# Patient Record
Sex: MALE | Race: WHITE | HISPANIC OR LATINO | URBAN - METROPOLITAN AREA
[De-identification: names, ages, dates, MRNs, and addresses within clinical notes are randomized per-mention and may not be internally consistent; named-entity substitution may affect disease eponyms.]

---

## 2023-09-18 ENCOUNTER — GYNECOLOGY VISIT (OUTPATIENT)
Dept: OBGYN | Facility: CLINIC | Age: 28
End: 2023-09-18
Payer: COMMERCIAL

## 2023-09-18 VITALS
SYSTOLIC BLOOD PRESSURE: 149 MMHG | DIASTOLIC BLOOD PRESSURE: 93 MMHG | HEIGHT: 66 IN | BODY MASS INDEX: 37.12 KG/M2 | WEIGHT: 231 LBS

## 2023-09-18 DIAGNOSIS — Z01.419 WOMEN'S ANNUAL ROUTINE GYNECOLOGICAL EXAMINATION: Primary | ICD-10-CM

## 2023-09-18 DIAGNOSIS — Z31.69 ENCOUNTER FOR PRECONCEPTION CONSULTATION: ICD-10-CM

## 2023-09-18 PROCEDURE — 99385 PREV VISIT NEW AGE 18-39: CPT | Performed by: OBSTETRICS & GYNECOLOGY

## 2023-09-18 PROCEDURE — 3077F SYST BP >= 140 MM HG: CPT | Performed by: OBSTETRICS & GYNECOLOGY

## 2023-09-18 PROCEDURE — 3080F DIAST BP >= 90 MM HG: CPT | Performed by: OBSTETRICS & GYNECOLOGY

## 2023-09-18 RX ORDER — ONDANSETRON 4 MG/1
TABLET, FILM COATED ORAL
COMMUNITY
Start: 2023-07-10

## 2023-09-18 NOTE — PROGRESS NOTES
ANNUAL GYNECOLOGY VISIT    Chief Complaint  Other (Family planning )      Subjective  Bora Rodrigez is a 28 y.o. adult who presents today for Annual Exam.  Patient is a transgender male and is sexually active () to a transgender female. They both recently stopped their hormone therapies to pursue pregnancy.  He reports since stopping hormones his menses have returned and our regular and ovulation predictor kits show ovulation around day 15.  His partner has trouble on occasion ejaculating with penetration so she has has been trying to ejaculate into a a clean menstrual cup and then inject it vaginally using a bulb syringe.  At this time, they have been trying to conceive for about 5 months and are not in a rush to pursue infertility concerns just yet.      Preventive Care     There is no immunization history on file for this patient.  Last Mammogram: n/a    Gynecology History and ROS  Current Sexual Activity: Yes  Current Contraception:  none    Menstrual History  No LMP recorded.  Periods are regular  q 28-31 days    Clots or heavy flow: No  Dysmenorrhea: No  Intermenstrual bleeding/spotting: No  Significant pain with periods:No  Bothersome PMS symptoms: No  Significant Pelvic Pain: No      Pap History  Last pap smear:  at PCP. Records requested  History of moderate or severe dysplasia: No    Cancer Risk Assessement:  Family history of:   - Breast cancer: no   - Ovarian cancer: no   - Uterine cancer: no   - Colon cancer: no    Obstetric History  OB History    Para Term  AB Living   0 0 0 0 0 0   SAB IAB Ectopic Molar Multiple Live Births   0 0 0 0 0 0       Past Medical History  Past Medical History:   Diagnosis Date    Allergy     Depression     Head ache     Migraine        Past Surgical History  Past Surgical History:   Procedure Laterality Date    MASTECTOMY BILATERAL SUBQ         Social History  Social History     Socioeconomic History    Marital status: Single     Spouse name: Not  on file    Number of children: Not on file    Years of education: Not on file    Highest education level: Not on file   Occupational History    Not on file   Tobacco Use    Smoking status: Never    Smokeless tobacco: Never   Vaping Use    Vaping Use: Never used   Substance and Sexual Activity    Alcohol use: Yes    Drug use: Never    Sexual activity: Yes     Partners: Male     Comment: pt is transgender male, sexually active with transgender female   Other Topics Concern    Not on file   Social History Narrative    Not on file     Social Determinants of Health     Financial Resource Strain: Not on file   Food Insecurity: Not on file   Transportation Needs: Not on file   Physical Activity: Not on file   Stress: Not on file   Social Connections: Not on file   Intimate Partner Violence: Not on file   Housing Stability: Not on file       Family History  Family History   Problem Relation Age of Onset    Thyroid Mother     Fibromyalgia Mother     Hypertension Father     Lung Cancer Maternal Grandmother     Hypertension Other        Home Medications  Current Outpatient Medications on File Prior to Visit   Medication Sig Dispense Refill    ondansetron (ZOFRAN) 4 MG Tab tablet 1 TABLET ORALLY TWICE DAILY AS NEEDED 20 DAYS       No current facility-administered medications on file prior to visit.       Allergies/Reactions  Allergies   Allergen Reactions    Cottonseed Oil Hives       ROS  Positive ROS: none  Gen: no fevers or chills, no significant weight loss or gain, excessive fatigue  Respiratory:  no cough or dyspnea  Cardiac:  no chest pain, no palpitations, no syncope  Breast: no breast discharge, pain, lump or skin changes  GI:  no heartburn, no abdominal pain, no nausea or vomiting  Urinary: no dysuria, urgency, frequency, incontinence   Psych: no depression or anxiety  Neuro: no migraines with aura, fainting spells, numbness or tingling  Extremities: no joint pain, persistently swollen ankles, recurrent leg  "cramps      Physical Examination:  Vital Signs:   Vitals:    09/18/23 1441   BP: (!) 149/93   BP Location: Right arm   Patient Position: Sitting   BP Cuff Size: Adult   Weight: 231 lb   Height: 5' 6\"     Body mass index is 37.28 kg/m².    Constitutional: The patient is well developed and well nourished.  Psychiatric: Patient is oriented to time place and person.   Skin: No rash observed.  Neck: Appears symmetric. Thyroid normal size  Respiratory: normal effort  Breast: double mastectomy  Abdomen: Soft, non-tender.  Pelvic Exam:     Vulva: external female genitalia are normal in appearance. No lesions    Urethra - no lesions, no erythema    Vagina: moist, pink, normal ruggae    Cervix: pink, smooth, no lesions, no CMT    Uterus - mildly enlarged, normal, shape, contour, mobile, anteverted    Ovaries: non-tender, no appreciable masses  Pap Smear performed: no  Extremeties: Legs are symmetric and without tenderness. There is no edema present.      Assessment & Plan  Bora Rodrigez is a 28 y.o. adult who presents today for Annual Gyn Exam.     1. Women's annual routine gynecological examination   - pap up to date   - regular menses   - history of double mastectomy    2. Encounter for preconception consultation   - take PNV   - reviewed timed intercourse and ovulation timing   - consider semen analysis or urology consult for partner if deemed necessary   - consider US to eval enlarged uterus for fibroids but these generally do not preclude or impair pregnancy      Return: Annually or PRN    Kassie Jensen D.O.        "

## 2023-09-18 NOTE — PROGRESS NOTES
Patient here for family planning   pharmacy verified.  Patient phone #:[phone] (home)     LMP- 9/10/23  PAP- a year ago [facility] medicine - records have been requested

## 2024-04-10 ENCOUNTER — PATIENT MESSAGE (OUTPATIENT)
Dept: OBGYN | Facility: CLINIC | Age: 29
End: 2024-04-10
Payer: COMMERCIAL

## 2024-04-10 DIAGNOSIS — O26.851 SPOTTING AFFECTING PREGNANCY IN FIRST TRIMESTER: ICD-10-CM

## 2024-04-15 ENCOUNTER — HOSPITAL ENCOUNTER (OUTPATIENT)
Dept: LAB | Facility: MEDICAL CENTER | Age: 29
End: 2024-04-15
Attending: OBSTETRICS & GYNECOLOGY
Payer: COMMERCIAL

## 2024-04-15 DIAGNOSIS — O26.851 SPOTTING AFFECTING PREGNANCY IN FIRST TRIMESTER: ICD-10-CM

## 2024-04-15 LAB — B-HCG SERPL-ACNC: ABNORMAL MIU/ML (ref 0–5)

## 2024-04-15 PROCEDURE — 84702 CHORIONIC GONADOTROPIN TEST: CPT

## 2024-04-15 PROCEDURE — 36415 COLL VENOUS BLD VENIPUNCTURE: CPT

## 2024-04-16 ENCOUNTER — HOSPITAL ENCOUNTER (OUTPATIENT)
Dept: RADIOLOGY | Facility: MEDICAL CENTER | Age: 29
End: 2024-04-16
Attending: OBSTETRICS & GYNECOLOGY
Payer: COMMERCIAL

## 2024-04-16 DIAGNOSIS — O26.851 SPOTTING AFFECTING PREGNANCY IN FIRST TRIMESTER: ICD-10-CM

## 2024-04-16 PROCEDURE — 76817 TRANSVAGINAL US OBSTETRIC: CPT

## 2024-05-09 ENCOUNTER — INITIAL PRENATAL (OUTPATIENT)
Dept: OBGYN | Facility: CLINIC | Age: 29
End: 2024-05-09
Payer: COMMERCIAL

## 2024-05-09 ENCOUNTER — HOSPITAL ENCOUNTER (OUTPATIENT)
Facility: MEDICAL CENTER | Age: 29
End: 2024-05-09
Attending: OBSTETRICS & GYNECOLOGY
Payer: COMMERCIAL

## 2024-05-09 VITALS — SYSTOLIC BLOOD PRESSURE: 134 MMHG | WEIGHT: 241 LBS | DIASTOLIC BLOOD PRESSURE: 100 MMHG | BODY MASS INDEX: 38.9 KG/M2

## 2024-05-09 DIAGNOSIS — N83.202 LEFT OVARIAN CYST: ICD-10-CM

## 2024-05-09 DIAGNOSIS — Z34.81 PRENATAL CARE, SUBSEQUENT PREGNANCY IN FIRST TRIMESTER: ICD-10-CM

## 2024-05-09 DIAGNOSIS — Z34.81 PRENATAL CARE, SUBSEQUENT PREGNANCY IN FIRST TRIMESTER: Primary | ICD-10-CM

## 2024-05-09 PROBLEM — Z34.80 PRENATAL CARE, SUBSEQUENT PREGNANCY: Status: ACTIVE | Noted: 2024-05-09

## 2024-05-09 PROCEDURE — 0500F INITIAL PRENATAL CARE VISIT: CPT | Performed by: OBSTETRICS & GYNECOLOGY

## 2024-05-09 RX ORDER — CHLORAL HYDRATE 500 MG
3000 CAPSULE ORAL DAILY
COMMUNITY

## 2024-05-09 ASSESSMENT — EDINBURGH POSTNATAL DEPRESSION SCALE (EPDS)
TOTAL SCORE: 10
I HAVE BEEN SO UNHAPPY THAT I HAVE BEEN CRYING: NO, NEVER
I HAVE BLAMED MYSELF UNNECESSARILY WHEN THINGS WENT WRONG: NO, NEVER
THE THOUGHT OF HARMING MYSELF HAS OCCURRED TO ME: NEVER
I HAVE BEEN ABLE TO LAUGH AND SEE THE FUNNY SIDE OF THINGS: NOT QUITE SO MUCH NOW
THINGS HAVE BEEN GETTING ON TOP OF ME: YES, SOMETIMES I HAVEN'T BEEN COPING AS WELL AS USUAL
I HAVE LOOKED FORWARD WITH ENJOYMENT TO THINGS: RATHER LESS THAN I USED TO
I HAVE BEEN ANXIOUS OR WORRIED FOR NO GOOD REASON: YES, SOMETIMES
I HAVE FELT SCARED OR PANICKY FOR NO GOOD REASON: YES, SOMETIMES
I HAVE BEEN SO UNHAPPY THAT I HAVE HAD DIFFICULTY SLEEPING: NOT VERY OFTEN
I HAVE FELT SAD OR MISERABLE: NOT VERY OFTEN

## 2024-05-09 NOTE — PROGRESS NOTES
Establish Pregnancy Visit    CC: First OB Visit    HPI: Patient is a 28 y.o.  at 9w2d who presents for his first OB visit.  Patient is a transgender male  to a transgender female.  This is their 2nd pregnancy.  He experienced an early miscarriage in November and was treated with D&C at [place].   He had an early US at 5w6d which confirmed NILS c/w LMP.  US demonstrated large left adnexal cyst which was likely what was felt on prior new patient exam back in September.  He denies vaginal bleeding, denies nausea, denies vomiting.   He denies headaches, or urinary symptoms.      DATING:     Procedure:  Transvaginal US performed by me and per my read:    Indication: confirm fetal viability    Findings:   G[place] intrauterine pregnancy @ 9w0d by CRL.   Positive yolk sac.   Positive fetal cardiac activity    Right ovary wnl.   Left Ovary large complex left ovarian cyst, possible endometrioma.   No free fluid in the cul-de-sac.    Impression:   Viable IUP @ 9w0d.  NILS by US of 24.  NILS by LMP: 12/10/24  Final NILS: 12/10/24      GYN HX:   Last Pap:   Hx Moderate or Severe Dysplasia : no  Hx STD : no    OBSTETRIC HISTORY:  OB History    Para Term  AB Living   2 0 0 0 1 0   SAB IAB Ectopic Molar Multiple Live Births   1 0 0 0 0 0      # Outcome Date GA Lbr Willi/2nd Weight Sex Delivery Anes PTL Lv   2 Current            1 SAB 2023              Birth Comments: D&C at [place]       MEDICAL HISTORY:  Past Medical History:   Diagnosis Date    Allergy     Depression     Head ache     Migraine        MEDICATIONS:  Current Outpatient Medications on File Prior to Visit   Medication Sig Dispense Refill    Prenatal MV-Min-Fe Fum-FA-DHA (PRENATAL 1 PO) Take  by mouth.      Omega-3 Fatty Acids (FISH OIL) 1000 MG Cap capsule Take 1,000 mg by mouth 3 times a day with meals.      ondansetron (ZOFRAN) 4 MG Tab tablet 1 TABLET ORALLY TWICE DAILY AS NEEDED 20 DAYS       No current facility-administered  medications on file prior to visit.       FAMILY HISTORY:  Family History   Problem Relation Age of Onset    Thyroid Mother     Fibromyalgia Mother     Hypertension Father     Lung Cancer Maternal Grandmother     Hypertension Other        SURGICAL HISTORY:  Past Surgical History:   Procedure Laterality Date    DILATION AND CURETTAGE  11/10/2023    MASTECTOMY BILATERAL SUBQ         ALLERGIES / REACTIONS:  Allergies   Allergen Reactions    Cottonseed Oil Hives                SOCIAL HISTORY:   reports that he has never smoked. He has never used smokeless tobacco. He reports current alcohol use. He reports that he does not use drugs.    ROS:   Gen: no fevers or chills, no significant weight loss or gain, excessive fatigue  Respiratory:  no cough or dyspnea  Cardiac:  no chest pain, no palpitations, no syncope  Breast: no breast discharge, pain, lump or skin changes  GI:  no heartburn, no abdominal pain, no nausea or vomiting  Urinary: no dysuria, urgency, frequency, incontinence   Psych: no depression or anxiety  Neuro: no migraines with aura, fainting spells, numbness or tingling  Extremities: no joint pain, persistently swollen ankles, recurrent leg cramps         PHYSICAL EXAMINATION:  Vital Signs:   Vitals:    05/09/24 1011   BP: (!) 134/100   Weight: 241 lb     Body mass index is 38.9 kg/m².  Constitutional: The patient is well developed and well nourished.  Psychiatric: Patient is oriented to time place and person.   Skin: No rash observed.  Neck: Neck appears symmetric. There are no masses or adenopathy present.  Respiratory: normal effort  Abdomen: Soft, non-tender.  Pelvic:    Vulva: normal.    Urethra: normal.   Vagina: normal.    Cervix: normal.    Uterus: consistent with dates    Adnexa: normal.   Perineum: normal.   GC / Chlamydia cultures obtained.   Pap Smear Obtained: yes  Extremeties: Legs are symmetric and without tenderness. There is no edema present.    ACOG SCREENING  Infection Prevention  1. High  Risk For HIV: No 6. Rash Or Illness Since LMP: No     2. High Risk For Hepatitis B or C: No 7. History Of STD, GC, Chlamydia, HPV Syphilis: No     3. Live With Someone With TB Or Exposed To TB: No 8. Have a cat in the home?: Yes     4. Patient Or Partner Has A History Of Herpes: No 8a. Responsible for changing the litter?: No     5. History of Chicken Pox: Yes             Genetic Screening/Teratology Counseling- Includes patient, baby's father, or anyone in either family with:  Patient's age 35 years or older as of estimated date of delivery: No     Thalassemia (Italian, Greek, Mediterranean, or  background): MCV less than 80: No     Neural tube defect (Meningomyelocele, Spina bifida, or Anencephaly): No     Congenital heart defect: No     Down syndrome: No     Rigo-Sachs (Ashkenazi Pentecostalism, Cajun, English [place]): No     Canavan disease (Ashkenazi Pentecostalism): No     Familial dysautonomia (Ashkenazi Pentecostalism): No     Sickle cell disease or trait (): No     Hemophilia or other blood disorders: No     Muscular dystrophy: No    Cystic fibrosis: No     Chip's chorea: No     Mental retardation/autism: Yes     If yes, was the person tested for Fragile X?: No     Other inherited genetic or chromosomal disorder: No     Maternal metabolic disorder (eg. Type 1 diabetes, PKU): No     Patient or baby's father had child with birth defects not listed above: No     Recurrent pregnancy loss, or a stillbirth: No     Medications (including supplements, vitamins, herbs, or OTC drugs)/illicit/recreational drugs/alcohol since last menstrual period: No                 ASSESSMENT AND PLAN:  28 y.o.  at 9w2d     Pregnancy Problems (from 23 to present)       Problem Noted Resolved    Prenatal care, subsequent pregnancy 2024 by Kassie Jensen D.O. No    Priority:  High      Overview Signed 2024 10:44 AM by Kassie Jensen D.O.     Declines NIPT/carrier screening  PNL 2024                  1.  Prenatal care, subsequent pregnancy in first trimester  - PNL ordered and std screening completed today off pap  - Dating reviewed: Dated by LMP  - Discussed options for genetic/aneuploidy testing and information given for pt to consider.  Advised to call insurance for cost of testing.           - he currently declines aneuploidy testing.           - he currently declines CF/SMA testing.  - Discussed recommendation for flu, Covid vaccine during pregnancy  - Discussed office policies, prenatal care timeline, weight gain, diet and activity.  - Taking PNV.  - Increase water intake and encouraged healthy nutrition. Encouraged moderate exercise may continue into final trimester.   - PREG CNTR PRENATAL PN; Future  - URINE DRUG SCREEN W/CONF (AR); Future  - THINPREP RFLX HPV ASCUS W/CTNG; Future    2. Left ovarian cyst   - discussed recommendation for removal via cystectomy or oophorectomy in the 2nd trimester given large size and risk of torsion and risk of need for emergency surgery   - discussed attempt at laparoscopic surgery but chance of need to convert to open   - will schedule between 14-16 weeks        Return in 4 weeks for next prenatal visit    Kassie Jensen D.O.

## 2024-05-12 ENCOUNTER — PATIENT MESSAGE (OUTPATIENT)
Dept: OBGYN | Facility: CLINIC | Age: 29
End: 2024-05-12
Payer: COMMERCIAL

## 2024-05-13 ENCOUNTER — HOSPITAL ENCOUNTER (OUTPATIENT)
Dept: LAB | Facility: MEDICAL CENTER | Age: 29
End: 2024-05-13
Attending: OBSTETRICS & GYNECOLOGY
Payer: COMMERCIAL

## 2024-05-13 DIAGNOSIS — Z34.81 PRENATAL CARE, SUBSEQUENT PREGNANCY IN FIRST TRIMESTER: ICD-10-CM

## 2024-05-13 LAB
ABO GROUP BLD: NORMAL
BLD GP AB SCN SERPL QL: NORMAL
C TRACH RRNA CVX QL NAA+PROBE: NEGATIVE
COMMENT NL11729A: NORMAL
CYTOLOGIST CVX/VAG CYTO: NORMAL
CYTOLOGY CVX/VAG DOC CYTO: NORMAL
CYTOLOGY CVX/VAG DOC THIN PREP: NORMAL
ERYTHROCYTE [DISTWIDTH] IN BLOOD BY AUTOMATED COUNT: 38 FL (ref 35.9–50)
HBV SURFACE AG SER QL: NORMAL
HCT VFR BLD AUTO: 38 % (ref 37–47)
HCV AB SER QL: NORMAL
HGB BLD-MCNC: 13.3 G/DL (ref 12–16)
HIV 1+2 AB+HIV1 P24 AG SERPL QL IA: NORMAL
MCH RBC QN AUTO: 29 PG (ref 27–33)
MCHC RBC AUTO-ENTMCNC: 35 G/DL (ref 32.2–35.5)
MCV RBC AUTO: 82.8 FL (ref 81.4–97.8)
N GONORRHOEA RRNA CVX QL NAA+PROBE: NEGATIVE
NOTE NL11727A: NORMAL
OTHER STN SPEC: NORMAL
PLATELET # BLD AUTO: 213 K/UL (ref 164–446)
PMV BLD AUTO: 10.9 FL (ref 9–12.9)
RBC # BLD AUTO: 4.59 M/UL (ref 4.2–5.4)
RH BLD: NORMAL
RUBV AB SER QL: 296 IU/ML
STAT OF ADQ CVX/VAG CYTO-IMP: NORMAL
T PALLIDUM AB SER QL IA: NORMAL
WBC # BLD AUTO: 6.1 K/UL (ref 4.8–10.8)

## 2024-05-15 ENCOUNTER — APPOINTMENT (OUTPATIENT)
Dept: ADMISSIONS | Facility: MEDICAL CENTER | Age: 29
End: 2024-05-15
Attending: OBSTETRICS & GYNECOLOGY
Payer: COMMERCIAL

## 2024-05-15 LAB
AMPHET CTO UR CFM-MCNC: NEGATIVE NG/ML
BACTERIA UR CULT: NORMAL
BARBITURATES CTO UR CFM-MCNC: NEGATIVE NG/ML
BENZODIAZ CTO UR CFM-MCNC: NEGATIVE NG/ML
CANNABINOIDS CTO UR CFM-MCNC: NEGATIVE NG/ML
COCAINE CTO UR CFM-MCNC: NEGATIVE NG/ML
CREAT UR-MCNC: 20.4 MG/DL (ref 20–400)
DRUG COMMENT 753798: NORMAL
METHADONE CTO UR CFM-MCNC: NEGATIVE NG/ML
OPIATES CTO UR CFM-MCNC: NEGATIVE NG/ML
PCP CTO UR CFM-MCNC: NEGATIVE NG/ML
PROPOXYPH CTO UR CFM-MCNC: NEGATIVE NG/ML
SIGNIFICANT IND 70042: NORMAL
SITE SITE: NORMAL
SOURCE SOURCE: NORMAL

## 2024-05-20 ENCOUNTER — GYNECOLOGY VISIT (OUTPATIENT)
Dept: OBGYN | Facility: CLINIC | Age: 29
End: 2024-05-20
Payer: COMMERCIAL

## 2024-05-20 VITALS — DIASTOLIC BLOOD PRESSURE: 77 MMHG | SYSTOLIC BLOOD PRESSURE: 109 MMHG | BODY MASS INDEX: 39.08 KG/M2 | WEIGHT: 242.1 LBS

## 2024-05-20 DIAGNOSIS — Z33.1 PREGNANCY, INCIDENTAL: ICD-10-CM

## 2024-05-20 DIAGNOSIS — Z34.81 ENCOUNTER FOR SUPERVISION OF OTHER NORMAL PREGNANCY, FIRST TRIMESTER: ICD-10-CM

## 2024-05-20 DIAGNOSIS — Z01.818 PREOP EXAMINATION: ICD-10-CM

## 2024-05-20 DIAGNOSIS — N83.202 LEFT OVARIAN CYST: ICD-10-CM

## 2024-05-20 PROCEDURE — 99213 OFFICE O/P EST LOW 20 MIN: CPT | Performed by: OBSTETRICS & GYNECOLOGY

## 2024-05-20 NOTE — PROGRESS NOTES
Pt is here for a pre OP visit    OP on 6/5/24 for laparoscopic ovarian cystectomy.     Phone/pharmacy verified.

## 2024-05-20 NOTE — PROGRESS NOTES
GYNECOLOGY PRE-OPERATIVE HISTORY AND PHYSICAL    CC: Preop      HPI: Patient is a 29 y.o.  who presents for his pre-operative history and physical. He is scheduled to undergo laparoscopic ovarian cystectomy, possible salpingo-oophorectomy, possible open for large complex ovarian cyst measuring upwards of 13-14cm with possible small solid component.   Patient is currently 10w6d and will be 13w1d on day of surgery      OBSTETRIC HISTORY:  OB History    Para Term  AB Living   2 0 0 0 1 0   SAB IAB Ectopic Molar Multiple Live Births   1 0 0 0 0 0      # Outcome Date GA Lbr Willi/2nd Weight Sex Delivery Anes PTL Lv   2 Current            1 SAB 2023              Birth Comments: D&C at [facility]       MEDICAL HISTORY:  Past Medical History:   Diagnosis Date    Allergy     Depression     Head ache     Migraine        MEDICATIONS:  Current Outpatient Medications on File Prior to Visit   Medication Sig Dispense Refill    Prenatal MV-Min-Fe Fum-FA-DHA (PRENATAL 1 PO) Take  by mouth.      Omega-3 Fatty Acids (FISH OIL) 1000 MG Cap capsule Take 1,000 mg by mouth 3 times a day with meals.       No current facility-administered medications on file prior to visit.       ALLERGIES / REACTIONS:  Allergies   Allergen Reactions    Cottonseed Oil Hives       FAMILY HISTORY:  Family History   Problem Relation Age of Onset    Thyroid Mother     Fibromyalgia Mother     Hypertension Father     Lung Cancer Maternal Grandmother     Hypertension Other        SURGICAL HISTORY:  Past Surgical History:   Procedure Laterality Date    DILATION AND CURETTAGE  11/10/2023    MASTECTOMY BILATERAL SUBQ         SOCIAL HISTORY:   reports that he has never smoked. He has never used smokeless tobacco. He reports current alcohol use. He reports that he does not use drugs.  Social History     Socioeconomic History    Marital status: Single     Spouse name: Not on file    Number of children: Not on file    Years of education: Not on file     Highest education level: Not on file   Occupational History    Not on file   Tobacco Use    Smoking status: Never    Smokeless tobacco: Never   Vaping Use    Vaping status: Never Used   Substance and Sexual Activity    Alcohol use: Yes    Drug use: Never    Sexual activity: Yes     Partners: Male     Comment: pt is transgender male, sexually active with transgender female   Other Topics Concern    Not on file   Social History Narrative    Not on file     Social Determinants of Health     Financial Resource Strain: Not on file   Food Insecurity: Not on file   Transportation Needs: Not on file   Physical Activity: Not on file   Stress: Not on file   Social Connections: Not on file   Intimate Partner Violence: Not on file   Housing Stability: Not on file         ROS:  General: Fever: no  HEENT: Sore Throat: no  CV: Chest Pain: no  Repiratory: Shortness of Breath: no  GI: Abdominal pain: no  : Dysuria: no    PHYSICAL EXAMINATION:  Vital Signs:   Vitals:    05/20/24 1058   BP: 109/77   BP Location: Right arm   Patient Position: Sitting   BP Cuff Size: Large adult   Weight: 242 lb 1.6 oz     Appearance/Psychiatric: She does not appear anxious.  Constitutional: The patient is well nourished.  Neck: Neck appears symmetric.  Heart: regular rate and rhythm  Lungs:clear to auscultation, Respirations unlabored, and no use of accessory muscles of inspiration noted  Abd: Soft, flat and non-tender and No masses or organomegaly  Extremeties: Legs are symmetric and without tenderness.  Skin: No rash observed.      LABS / IMAGING:  UTERUS:  There is a single living intrauterine pregnancy.     Gestational sac shape is within normal limits.     No perigestational hemorrhage.     Cardiac motion: 127 bpm.     Crown rump length: 0.30 cm , consistent with 5 weeks 6 days .     This corresponds to an estimated date of delivery of 12/11/2024.     OVARIES:     The right ovary measures 3.08 cm x 2.13 cm x 3.32 cm. Doppler examination of the  right ovary shows normal waveforms. The right ovary is normal in size and appearance.     The left ovary is not identified.     Complex cystic structure containing a small solid component and debris is identified superior to the uterus measuring 13.5 x 11.4 x 11.9 cm.     No free fluid in the pelvis.     IMPRESSION:     1.  Single living intrauterine pregnancy at 5 weeks 6 days estimated gestational age.     2.  Large complex cystic structure containing debris and small solid component is identified superior to the uterus. Left ovary is not identified and this lesion could be arising from the left ovary. Differential diagnosis includes physiologic cyst,   dermoid cyst, as well as other benign or malignant ovarian neoplasm. Paraovarian cyst is also a possibility.      ASSESSMENT AND PLAN:  29 y.o.      1. Left ovarian cyst        2. Preop examination        3. Pregnancy, incidental        4. Encounter for supervision of other normal pregnancy, first trimester  PANORAMA PRENATAL TEST          Surgery indications: large ovarian cyst, at increased risk of torsion; currently pregnant    Surgeries planned: laparoscopic cystectomy vs oophorectomy.  Likely oophorectomy if dermoid cyst found.  Discussed that we will attempt laparosocpy but size of the cyst may preclude this.  If not, pfannenstiel incision will be done   - reviewed preop instructions   - reviewed postop expectation, pain control  - will plan for limited bedside US to confirm fetal viability before and after surgery        Kassie Jensen D.O.

## 2024-05-22 ENCOUNTER — PATIENT MESSAGE (OUTPATIENT)
Dept: OBGYN | Facility: CLINIC | Age: 29
End: 2024-05-22

## 2024-05-22 ENCOUNTER — PRE-ADMISSION TESTING (OUTPATIENT)
Dept: ADMISSIONS | Facility: MEDICAL CENTER | Age: 29
End: 2024-05-22
Attending: OBSTETRICS & GYNECOLOGY
Payer: COMMERCIAL

## 2024-05-22 DIAGNOSIS — Z01.812 PRE-OPERATIVE LABORATORY EXAMINATION: ICD-10-CM

## 2024-05-29 ENCOUNTER — HOSPITAL ENCOUNTER (OUTPATIENT)
Dept: LAB | Facility: MEDICAL CENTER | Age: 29
End: 2024-05-29
Attending: OBSTETRICS & GYNECOLOGY
Payer: COMMERCIAL

## 2024-05-29 DIAGNOSIS — Z01.812 PRE-OPERATIVE LABORATORY EXAMINATION: ICD-10-CM

## 2024-05-29 LAB
ANION GAP SERPL CALC-SCNC: 14 MMOL/L (ref 7–16)
BUN SERPL-MCNC: 8 MG/DL (ref 8–22)
CALCIUM SERPL-MCNC: 9 MG/DL (ref 8.5–10.5)
CHLORIDE SERPL-SCNC: 104 MMOL/L (ref 96–112)
CO2 SERPL-SCNC: 20 MMOL/L (ref 20–33)
CREAT SERPL-MCNC: 0.52 MG/DL (ref 0.5–1.4)
FASTING STATUS PATIENT QL REPORTED: NORMAL
GFR SERPLBLD CREATININE-BSD FMLA CKD-EPI: 128 ML/MIN/1.73 M 2
GLUCOSE SERPL-MCNC: 83 MG/DL (ref 65–99)
POTASSIUM SERPL-SCNC: 3.6 MMOL/L (ref 3.6–5.5)
SODIUM SERPL-SCNC: 138 MMOL/L (ref 135–145)

## 2024-06-03 RX ORDER — ACETAMINOPHEN 500 MG
1000 TABLET ORAL
Status: CANCELLED | OUTPATIENT
Start: 2024-06-05 | End: 2024-06-06

## 2024-06-05 ENCOUNTER — ANESTHESIA (OUTPATIENT)
Dept: SURGERY | Facility: MEDICAL CENTER | Age: 29
End: 2024-06-05
Payer: COMMERCIAL

## 2024-06-05 ENCOUNTER — ANESTHESIA EVENT (OUTPATIENT)
Dept: SURGERY | Facility: MEDICAL CENTER | Age: 29
End: 2024-06-05
Payer: COMMERCIAL

## 2024-06-05 ENCOUNTER — HOSPITAL ENCOUNTER (OUTPATIENT)
Facility: MEDICAL CENTER | Age: 29
End: 2024-06-05
Attending: OBSTETRICS & GYNECOLOGY | Admitting: OBSTETRICS & GYNECOLOGY
Payer: COMMERCIAL

## 2024-06-05 ENCOUNTER — APPOINTMENT (OUTPATIENT)
Dept: RADIOLOGY | Facility: MEDICAL CENTER | Age: 29
End: 2024-06-05
Attending: OBSTETRICS & GYNECOLOGY
Payer: COMMERCIAL

## 2024-06-05 VITALS
SYSTOLIC BLOOD PRESSURE: 116 MMHG | BODY MASS INDEX: 38.02 KG/M2 | DIASTOLIC BLOOD PRESSURE: 57 MMHG | RESPIRATION RATE: 23 BRPM | TEMPERATURE: 97.3 F | HEART RATE: 82 BPM | WEIGHT: 236.55 LBS | HEIGHT: 66 IN | OXYGEN SATURATION: 95 %

## 2024-06-05 DIAGNOSIS — G89.18 POST-OP PAIN: ICD-10-CM

## 2024-06-05 PROBLEM — Z98.890 PONV (POSTOPERATIVE NAUSEA AND VOMITING): Status: ACTIVE | Noted: 2024-06-05

## 2024-06-05 PROBLEM — R11.2 PONV (POSTOPERATIVE NAUSEA AND VOMITING): Status: ACTIVE | Noted: 2024-06-05

## 2024-06-05 LAB — PATHOLOGY CONSULT NOTE: NORMAL

## 2024-06-05 PROCEDURE — 700102 HCHG RX REV CODE 250 W/ 637 OVERRIDE(OP): Performed by: ANESTHESIOLOGY

## 2024-06-05 PROCEDURE — 160002 HCHG RECOVERY MINUTES (STAT): Performed by: OBSTETRICS & GYNECOLOGY

## 2024-06-05 PROCEDURE — 700111 HCHG RX REV CODE 636 W/ 250 OVERRIDE (IP): Mod: JZ | Performed by: ANESTHESIOLOGY

## 2024-06-05 PROCEDURE — 58661 LAPAROSCOPY REMOVE ADNEXA: CPT | Mod: LT | Performed by: OBSTETRICS & GYNECOLOGY

## 2024-06-05 PROCEDURE — 58661 LAPAROSCOPY REMOVE ADNEXA: CPT | Mod: 80,LT | Performed by: OBSTETRICS & GYNECOLOGY

## 2024-06-05 PROCEDURE — 160048 HCHG OR STATISTICAL LEVEL 1-5: Performed by: OBSTETRICS & GYNECOLOGY

## 2024-06-05 PROCEDURE — 160009 HCHG ANES TIME/MIN: Performed by: OBSTETRICS & GYNECOLOGY

## 2024-06-05 PROCEDURE — 700101 HCHG RX REV CODE 250: Performed by: ANESTHESIOLOGY

## 2024-06-05 PROCEDURE — 160025 RECOVERY II MINUTES (STATS): Performed by: OBSTETRICS & GYNECOLOGY

## 2024-06-05 PROCEDURE — 88307 TISSUE EXAM BY PATHOLOGIST: CPT

## 2024-06-05 PROCEDURE — 160028 HCHG SURGERY MINUTES - 1ST 30 MINS LEVEL 3: Performed by: OBSTETRICS & GYNECOLOGY

## 2024-06-05 PROCEDURE — 76815 OB US LIMITED FETUS(S): CPT

## 2024-06-05 PROCEDURE — 160036 HCHG PACU - EA ADDL 30 MINS PHASE I: Performed by: OBSTETRICS & GYNECOLOGY

## 2024-06-05 PROCEDURE — 700101 HCHG RX REV CODE 250: Performed by: OBSTETRICS & GYNECOLOGY

## 2024-06-05 PROCEDURE — 160035 HCHG PACU - 1ST 60 MINS PHASE I: Performed by: OBSTETRICS & GYNECOLOGY

## 2024-06-05 PROCEDURE — A9270 NON-COVERED ITEM OR SERVICE: HCPCS | Performed by: ANESTHESIOLOGY

## 2024-06-05 PROCEDURE — 160039 HCHG SURGERY MINUTES - EA ADDL 1 MIN LEVEL 3: Performed by: OBSTETRICS & GYNECOLOGY

## 2024-06-05 PROCEDURE — 160046 HCHG PACU - 1ST 60 MINS PHASE II: Performed by: OBSTETRICS & GYNECOLOGY

## 2024-06-05 PROCEDURE — 700105 HCHG RX REV CODE 258: Performed by: OBSTETRICS & GYNECOLOGY

## 2024-06-05 RX ORDER — SCOLOPAMINE TRANSDERMAL SYSTEM 1 MG/1
1 PATCH, EXTENDED RELEASE TRANSDERMAL
Status: DISCONTINUED | OUTPATIENT
Start: 2024-06-05 | End: 2024-06-05

## 2024-06-05 RX ORDER — NEOSTIGMINE METHYLSULFATE 1 MG/ML
INJECTION, SOLUTION INTRAVENOUS PRN
Status: DISCONTINUED | OUTPATIENT
Start: 2024-06-05 | End: 2024-06-05 | Stop reason: SURG

## 2024-06-05 RX ORDER — OXYCODONE HCL 5 MG/5 ML
10 SOLUTION, ORAL ORAL
Status: DISCONTINUED | OUTPATIENT
Start: 2024-06-05 | End: 2024-06-05 | Stop reason: HOSPADM

## 2024-06-05 RX ORDER — SUCCINYLCHOLINE CHLORIDE 20 MG/ML
INJECTION INTRAMUSCULAR; INTRAVENOUS PRN
Status: DISCONTINUED | OUTPATIENT
Start: 2024-06-05 | End: 2024-06-05 | Stop reason: SURG

## 2024-06-05 RX ORDER — LABETALOL HYDROCHLORIDE 5 MG/ML
5 INJECTION, SOLUTION INTRAVENOUS
Status: DISCONTINUED | OUTPATIENT
Start: 2024-06-05 | End: 2024-06-05 | Stop reason: HOSPADM

## 2024-06-05 RX ORDER — IBUPROFEN 400 MG/1
TABLET ORAL
COMMUNITY
Start: 2024-06-05

## 2024-06-05 RX ORDER — EPHEDRINE SULFATE 50 MG/ML
5 INJECTION, SOLUTION INTRAVENOUS
Status: DISCONTINUED | OUTPATIENT
Start: 2024-06-05 | End: 2024-06-05 | Stop reason: HOSPADM

## 2024-06-05 RX ORDER — OXYCODONE HCL 5 MG/5 ML
5 SOLUTION, ORAL ORAL
Status: DISCONTINUED | OUTPATIENT
Start: 2024-06-05 | End: 2024-06-05 | Stop reason: HOSPADM

## 2024-06-05 RX ORDER — ONDANSETRON 2 MG/ML
4 INJECTION INTRAMUSCULAR; INTRAVENOUS
Status: COMPLETED | OUTPATIENT
Start: 2024-06-05 | End: 2024-06-05

## 2024-06-05 RX ORDER — LIDOCAINE HYDROCHLORIDE 20 MG/ML
INJECTION, SOLUTION EPIDURAL; INFILTRATION; INTRACAUDAL; PERINEURAL PRN
Status: DISCONTINUED | OUTPATIENT
Start: 2024-06-05 | End: 2024-06-05 | Stop reason: SURG

## 2024-06-05 RX ORDER — OXYCODONE HYDROCHLORIDE 5 MG/1
5 CAPSULE ORAL EVERY 4 HOURS PRN
Qty: 15 CAPSULE | Refills: 0 | Status: SHIPPED | OUTPATIENT
Start: 2024-06-05 | End: 2024-06-08

## 2024-06-05 RX ORDER — ROCURONIUM BROMIDE 10 MG/ML
INJECTION, SOLUTION INTRAVENOUS PRN
Status: DISCONTINUED | OUTPATIENT
Start: 2024-06-05 | End: 2024-06-05 | Stop reason: SURG

## 2024-06-05 RX ORDER — ACETAMINOPHEN 500 MG
TABLET ORAL
COMMUNITY
Start: 2024-06-05

## 2024-06-05 RX ORDER — HYDROMORPHONE HYDROCHLORIDE 2 MG/ML
INJECTION, SOLUTION INTRAMUSCULAR; INTRAVENOUS; SUBCUTANEOUS PRN
Status: DISCONTINUED | OUTPATIENT
Start: 2024-06-05 | End: 2024-06-05 | Stop reason: SURG

## 2024-06-05 RX ORDER — GLYCOPYRROLATE 0.2 MG/ML
INJECTION INTRAMUSCULAR; INTRAVENOUS PRN
Status: DISCONTINUED | OUTPATIENT
Start: 2024-06-05 | End: 2024-06-05 | Stop reason: SURG

## 2024-06-05 RX ORDER — EPINEPHRINE 1 MG/ML(1)
AMPUL (ML) INJECTION
Status: DISCONTINUED
Start: 2024-06-05 | End: 2024-06-05 | Stop reason: HOSPADM

## 2024-06-05 RX ORDER — CEFAZOLIN SODIUM 1 G/3ML
INJECTION, POWDER, FOR SOLUTION INTRAMUSCULAR; INTRAVENOUS PRN
Status: DISCONTINUED | OUTPATIENT
Start: 2024-06-05 | End: 2024-06-05 | Stop reason: SURG

## 2024-06-05 RX ORDER — BUPIVACAINE HYDROCHLORIDE 2.5 MG/ML
INJECTION, SOLUTION EPIDURAL; INFILTRATION; INTRACAUDAL
Status: DISCONTINUED
Start: 2024-06-05 | End: 2024-06-05 | Stop reason: HOSPADM

## 2024-06-05 RX ORDER — OXYCODONE HYDROCHLORIDE 5 MG/1
5 CAPSULE ORAL EVERY 4 HOURS PRN
Qty: 15 CAPSULE | Refills: 0 | Status: SHIPPED | OUTPATIENT
Start: 2024-06-05 | End: 2024-06-05

## 2024-06-05 RX ORDER — HYDROMORPHONE HYDROCHLORIDE 1 MG/ML
0.1 INJECTION, SOLUTION INTRAMUSCULAR; INTRAVENOUS; SUBCUTANEOUS
Status: DISCONTINUED | OUTPATIENT
Start: 2024-06-05 | End: 2024-06-05 | Stop reason: HOSPADM

## 2024-06-05 RX ORDER — BUPIVACAINE HYDROCHLORIDE AND EPINEPHRINE 2.5; 5 MG/ML; UG/ML
INJECTION, SOLUTION EPIDURAL; INFILTRATION; INTRACAUDAL; PERINEURAL
Status: DISCONTINUED | OUTPATIENT
Start: 2024-06-05 | End: 2024-06-05 | Stop reason: HOSPADM

## 2024-06-05 RX ORDER — SODIUM CHLORIDE, SODIUM LACTATE, POTASSIUM CHLORIDE, CALCIUM CHLORIDE 600; 310; 30; 20 MG/100ML; MG/100ML; MG/100ML; MG/100ML
INJECTION, SOLUTION INTRAVENOUS CONTINUOUS
Status: DISCONTINUED | OUTPATIENT
Start: 2024-06-05 | End: 2024-06-05 | Stop reason: HOSPADM

## 2024-06-05 RX ORDER — HYDROMORPHONE HYDROCHLORIDE 1 MG/ML
0.4 INJECTION, SOLUTION INTRAMUSCULAR; INTRAVENOUS; SUBCUTANEOUS
Status: DISCONTINUED | OUTPATIENT
Start: 2024-06-05 | End: 2024-06-05 | Stop reason: HOSPADM

## 2024-06-05 RX ORDER — DIPHENHYDRAMINE HYDROCHLORIDE 50 MG/ML
12.5 INJECTION INTRAMUSCULAR; INTRAVENOUS
Status: DISCONTINUED | OUTPATIENT
Start: 2024-06-05 | End: 2024-06-05 | Stop reason: HOSPADM

## 2024-06-05 RX ORDER — ACETAMINOPHEN 500 MG
1000 TABLET ORAL ONCE
Status: COMPLETED | OUTPATIENT
Start: 2024-06-05 | End: 2024-06-05

## 2024-06-05 RX ORDER — ONDANSETRON 2 MG/ML
INJECTION INTRAMUSCULAR; INTRAVENOUS PRN
Status: DISCONTINUED | OUTPATIENT
Start: 2024-06-05 | End: 2024-06-05 | Stop reason: SURG

## 2024-06-05 RX ORDER — METOCLOPRAMIDE HYDROCHLORIDE 5 MG/ML
INJECTION INTRAMUSCULAR; INTRAVENOUS PRN
Status: DISCONTINUED | OUTPATIENT
Start: 2024-06-05 | End: 2024-06-05 | Stop reason: SURG

## 2024-06-05 RX ORDER — HYDROMORPHONE HYDROCHLORIDE 1 MG/ML
0.2 INJECTION, SOLUTION INTRAMUSCULAR; INTRAVENOUS; SUBCUTANEOUS
Status: DISCONTINUED | OUTPATIENT
Start: 2024-06-05 | End: 2024-06-05 | Stop reason: HOSPADM

## 2024-06-05 RX ORDER — PHENYLEPHRINE HCL IN 0.9% NACL 1 MG/10 ML
SYRINGE (ML) INTRAVENOUS PRN
Status: DISCONTINUED | OUTPATIENT
Start: 2024-06-05 | End: 2024-06-05 | Stop reason: SURG

## 2024-06-05 RX ADMIN — FENTANYL CITRATE 50 MCG: 50 INJECTION, SOLUTION INTRAMUSCULAR; INTRAVENOUS at 12:41

## 2024-06-05 RX ADMIN — NEOSTIGMINE METHYLSULFATE 5 MG: 1 INJECTION INTRAVENOUS at 13:36

## 2024-06-05 RX ADMIN — METOCLOPRAMIDE 10 MG: 5 INJECTION, SOLUTION INTRAMUSCULAR; INTRAVENOUS at 12:35

## 2024-06-05 RX ADMIN — SUCCINYLCHOLINE CHLORIDE 100 MG: 20 INJECTION, SOLUTION INTRAMUSCULAR; INTRAVENOUS at 12:08

## 2024-06-05 RX ADMIN — ONDANSETRON 4 MG: 2 INJECTION INTRAMUSCULAR; INTRAVENOUS at 13:26

## 2024-06-05 RX ADMIN — Medication 100 MCG: at 13:29

## 2024-06-05 RX ADMIN — ROCURONIUM BROMIDE 40 MG: 50 INJECTION, SOLUTION INTRAVENOUS at 12:15

## 2024-06-05 RX ADMIN — PROPOFOL 250 MG: 10 INJECTION, EMULSION INTRAVENOUS at 12:08

## 2024-06-05 RX ADMIN — Medication 100 MCG: at 12:30

## 2024-06-05 RX ADMIN — Medication 100 MCG: at 12:22

## 2024-06-05 RX ADMIN — ROCURONIUM BROMIDE 10 MG: 50 INJECTION, SOLUTION INTRAVENOUS at 13:07

## 2024-06-05 RX ADMIN — FENTANYL CITRATE 50 MCG: 50 INJECTION, SOLUTION INTRAMUSCULAR; INTRAVENOUS at 12:07

## 2024-06-05 RX ADMIN — PROPOFOL 60 MCG/KG/MIN: 10 INJECTION, EMULSION INTRAVENOUS at 12:20

## 2024-06-05 RX ADMIN — FENTANYL CITRATE 50 MCG: 50 INJECTION, SOLUTION INTRAMUSCULAR; INTRAVENOUS at 13:25

## 2024-06-05 RX ADMIN — LIDOCAINE HYDROCHLORIDE 100 MG: 20 INJECTION, SOLUTION EPIDURAL; INFILTRATION; INTRACAUDAL at 12:08

## 2024-06-05 RX ADMIN — HYDROMORPHONE HYDROCHLORIDE 0.4 MG: 2 INJECTION INTRAMUSCULAR; INTRAVENOUS; SUBCUTANEOUS at 13:37

## 2024-06-05 RX ADMIN — FENTANYL CITRATE 50 MCG: 50 INJECTION, SOLUTION INTRAMUSCULAR; INTRAVENOUS at 12:25

## 2024-06-05 RX ADMIN — HYDROMORPHONE HYDROCHLORIDE 0.2 MG: 2 INJECTION INTRAMUSCULAR; INTRAVENOUS; SUBCUTANEOUS at 13:41

## 2024-06-05 RX ADMIN — CEFAZOLIN 2 G: 1 INJECTION, POWDER, FOR SOLUTION INTRAMUSCULAR; INTRAVENOUS at 12:13

## 2024-06-05 RX ADMIN — GLYCOPYRROLATE 1 MG: 0.2 INJECTION INTRAMUSCULAR; INTRAVENOUS at 13:36

## 2024-06-05 RX ADMIN — ROCURONIUM BROMIDE 10 MG: 50 INJECTION, SOLUTION INTRAVENOUS at 12:08

## 2024-06-05 RX ADMIN — PROPOFOL 50 MG: 10 INJECTION, EMULSION INTRAVENOUS at 12:10

## 2024-06-05 RX ADMIN — ONDANSETRON 4 MG: 2 INJECTION INTRAMUSCULAR; INTRAVENOUS at 14:43

## 2024-06-05 RX ADMIN — SODIUM CHLORIDE, POTASSIUM CHLORIDE, SODIUM LACTATE AND CALCIUM CHLORIDE: 600; 310; 30; 20 INJECTION, SOLUTION INTRAVENOUS at 11:58

## 2024-06-05 RX ADMIN — ACETAMINOPHEN 1000 MG: 500 TABLET, FILM COATED ORAL at 11:48

## 2024-06-05 ASSESSMENT — PAIN DESCRIPTION - PAIN TYPE: TYPE: SURGICAL PAIN

## 2024-06-05 NOTE — OR NURSING
Introduced self to patient/SO and discussed plan of care. Surgical consent signed. IV inserted without difficulty. Checked oxygen tank on gurney, 379 psi. Pre op complete.

## 2024-06-05 NOTE — ANESTHESIA TIME REPORT
Anesthesia Start and Stop Event Times       Date Time Event    6/5/2024 1150 Ready for Procedure     1203 Anesthesia Start     1351 Anesthesia Stop          Responsible Staff  06/05/24      Name Role Begin End    Yulissa Mcginnis M.D. Anesth 1203 1351          Overtime Reason:  no overtime (within assigned shift)    Comments:

## 2024-06-05 NOTE — OR NURSING
1506- Report from Grace BOOTH, care assumed.    1515- Discharge instructions discussed with pt and pt's spouse. All questions answered. Verbalized understanding.    1530- pt up to bathroom via gurney. Able to void without difficulty. Dressed independently.    1547- Pt expresses readiness for discharge. PIV removed with tip intact. Pt escorted out of department in wheelchair with all belongings. Discharged home to responsible adult.

## 2024-06-05 NOTE — OP REPORT
Operative Note - Operative Laparoscopy    Patient: Bora Rodrigez  MRN: [id number]   YOB: 1995   Age: 29 y.o.   Sex:adult  Unit: [facility] INTRA-OP [facility]  Room/Bed: R OR POOL/NONE  Location: [facility]      Date of Procedure: 2024     Preoperative Diagnosis: COMPLEX LEFT OVARIAN CYST, 13WKS GESTATION  Postoperative Diagnosis: same   Procedure: Laparoscopic Left salpingo-oophorectomy  Surgeon: Surgeons and Role:     * Kassie Jensen D.O. - Primary     * Elvie Ghosh D.O.  Anesthesia: General   Antibiotics: ancef  Estimated Blood Loss: minimal    Specimens: enlarged left ovary and normal left tube    Findings: Enlarged simple appearing cystic left ovary, about 14cm; enlarged hypervascular uterus consistent with pregnancy    Complications: none      INDICATIONS:   29 y.o. transgender male  at 13.1wks gestations presents for operative laparoscopy for removal of a large complex ovarian cyst. The risks, benefits and alternatives of laparoscopy were discussed with the patient, including but not limited to infection, scar, loss of blood, venous thrombosis, injury to bowel, bladder or ureter, injury to blood vessels, risk to the pregnancy including miscarriage and chance of needing to convert to laparotomy to complete the surgery or any repair.       PROCEDURE:   The patient was taken to the operating room where general anesthesia was undertaken and found to be adequate. He was then prepped and draped in the dorsal lithotomy position.  A time out was taken. Vaginal prep was avoided.  Under sterile technique a ott catheter was placed using betadine prep.  No vaginal or uterine manipulator was placed.     The supraumbilical region was anesthetized with 0.25% marcaine with epi.  A 2cm horizontal skin incision was then made about 2cm superior to the umbilicus in mini-lap fashion. Mansfield cut-down technique was used to get down to the fascia. The fascia was grasped with a  Kocher clamp and elevated and incised with the scalpel.  The fascial corners were tagged with 0-vicryl on a UR6.  The peritoneum was then doubly grasped with hemostats, elevated away from the bowel and entered sharply with Metzenbaum scissors.  A mini Toby retractor was placed and sealed with the gel port.   The abdomen was insufflated.  Two lateral 5mm ports were placed on left and right in the same manner under direct visualization.  At this time a thorough investigation of the pelvis was performed. The investigation revealed the findings listed above.    The utero-ovarian ligament was identified and coagulated and cut with the Ligasure.  The dissection was carried down the mesosalpinx.  Initially the tube was going to be left however with manipulation it began to bleed so the left tube was transected at the cornual end of the uterus and detached.  The dissection was carried down the mesosalpinx and  from the ovary at the fimbrial end.  The tube was removed from the abdomen.  The IP ligament was identified and then taking care to stay right next to the ovarian mass, the IP was ligated with the Ligasure and the specimen detached.  The pedicles were closely inspected and hemostatic.  The uterus was NOT MANIPULATED or interfered with throughout the entire procedure to minimize risk to the pregnancy.    A 15-mm endocatch bag was introduced through the gel port and used to scoop the ovary into the bag and sealed.  The bag was brought up through the gel port and then the gel removed and bag opened and held with Gena clamps.  The ovary was incised and suction used to deflate the simple serous fluid until the specimen was removed from the abdomen.  All content and fluid remained contained within the bag with no spillage.  The specimen was passed off.     All ports and the Toby-O ring were removed from the abdomen.  The fascia was closed with the 0-vircyl on the UR6 suture tagged previously in running  fashion.  The subcutaneous space was closed with a simple horizontal mattress stitch.  The skin incisions were then closed with a 4-0 Monocryl stitch. The skin was then dressed with Steri-strips and Tegaderm dressings.     Collins catheter was removed.  Sponge, lap and needle counts were correct. The patient was taken to the PACU in stable condition.      Kassie Jensen D.O.

## 2024-06-05 NOTE — ANESTHESIA POSTPROCEDURE EVALUATION
Patient: Bora Rodrigez    Procedure Summary       Date: 06/05/24 Room / Location: [facility] ROOM 25 / SURGERY SAME DAY [facility]    Anesthesia Start: 1203 Anesthesia Stop: 1351    Procedures:       LAPAROSCOPIC OVARIAN CYSTECTOMY, POSSIBLE SALPINGO OOPHORECTOMY (Left: Pelvis)      EXCISION, CYST, OVARY (Left: Pelvis) Diagnosis: (COMPLEX OVARIAN CYST, PRENATAL CARE,)    Surgeons: Kassie Jensen D.O. Responsible Provider: Yulissa Mcginnis M.D.    Anesthesia Type: general ASA Status: 2            Final Anesthesia Type: general  Last vitals  BP   Blood Pressure: 115/55    Temp   36.3 °C (97.3 °F)    Pulse   77   Resp   19    SpO2   96 %      Anesthesia Post Evaluation    Patient location during evaluation: PACU  Patient participation: complete - patient participated  Level of consciousness: awake and alert    Airway patency: patent  Anesthetic complications: no  Cardiovascular status: hemodynamically stable  Respiratory status: acceptable  Hydration status: euvolemic    PONV: none          There were no known notable events for this encounter.     Nurse Pain Score: 0 (NPRS)

## 2024-06-05 NOTE — OR NURSING
1417: Report from EMMY Alvarez. Pt resting in bed; on room air. Denies pain and nausea    1437: Ultrasound completed at bedside    1443: Zofran given per mar    1506: Report to EMMY Dangelo

## 2024-06-05 NOTE — OR NURSING
1347: Pt arrived from OR to PACU 9. Connected to monitor. Report received from anesthesia & RN. VSS. Oxygen at 6L via mask. Breaths calm, even, and unlabored.  No signs of pain.     3 lap sites with steri strips, gauze, Tegaderm in place, all sites clean dry and intact.     Ultrasound order placed. Ultrasound called, will come to bedside for post op.     1400 Wife Bindu brought to bedside. Pt tolerating po sips. Denies pain or nausea at this time.     1404 Dr Jensen at bedside, discussed procedure and post op care.    1417 Report to Grace BOOTH.

## 2024-06-05 NOTE — ANESTHESIA PREPROCEDURE EVALUATION
Case: [id number] Date/Time: 06/05/24 1145    Procedures:       LAPAROSCOPIC OVARIAN CYSTECTOMY, POSSIBLE SALPINGO OOPHORECTOMY, POSSIBLE OPEN      EXCISION, CYST, OVARY    Pre-op diagnosis: COMPLEX OVARIAN CYST, PRENATAL CARE,    Location: [facility] ROOM 24 / SURGERY SAME DAY [facility]    Surgeons: Kassie Jensen D.O.          30yo patient, transgender F -> M, currently pregnant with 13w fetus, here for open ovarian cystectomy    Relevant Problems   ANESTHESIA   (positive) PONV (postoperative nausea and vomiting)      PULMONARY (within normal limits)      NEURO (within normal limits)      CARDIAC (within normal limits)      Other   (positive) Left ovarian cyst   13 weeks pregnant    Physical Exam    Airway   Mallampati: II  TM distance: >3 FB  Neck ROM: full       Cardiovascular - normal exam  Rhythm: regular  Rate: normal  (-) murmur     Dental - normal exam           Pulmonary - normal exam  Breath sounds clear to auscultation     Abdominal    Neurological - normal exam                   Anesthesia Plan    ASA 2       Plan - general       Airway plan will be ETT          Induction: intravenous    Postoperative Plan: Postoperative administration of opioids is intended.    Pertinent diagnostic labs and testing reviewed    Informed Consent:    Anesthetic plan and risks discussed with patient (due to intrabdominal procedure with particular operative site near gravid uterus, high risk of pregnancy loss. Will do pre and post surgery FHTs).    Use of blood products discussed with: patient whom consented to blood products.

## 2024-06-05 NOTE — DISCHARGE INSTRUCTIONS
If any questions arise, call your provider.  If your provider is not available, please feel free to call the Surgical Center at [phone].    MEDICATIONS: Resume taking daily medication.  Take prescribed pain medication with food.  If no medication is prescribed, you may take non-aspirin pain medication if needed.  PAIN MEDICATION CAN BE VERY CONSTIPATING.  Take a stool softener or laxative such as senokot, pericolace, or milk of magnesia if needed.    Last pain medication given at   11:48 am- Tylenol.  Ok to take again after 5:48 pm         What to Expect Post Anesthesia    Rest and take it easy for the first 24 hours.  A responsible adult is recommended to remain with you during that time.  It is normal to feel sleepy.  We encourage you to not do anything that requires balance, judgment or coordination.    FOR 24 HOURS DO NOT:  Drive, operate machinery or run household appliances.  Drink beer or alcoholic beverages.  Make important decisions or sign legal documents.    To avoid nausea, slowly advance diet as tolerated, avoiding spicy or greasy foods for the first day.  Add more substantial food to your diet according to your provider's instructions.  Babies can be fed formula or breast milk as soon as they are hungry.  INCREASE FLUIDS AND FIBER TO AVOID CONSTIPATION.    MILD FLU-LIKE SYMPTOMS ARE NORMAL.  YOU MAY EXPERIENCE GENERALIZED MUSCLE ACHES, THROAT IRRITATION, HEADACHE AND/OR SOME NAUSEA.

## 2024-06-05 NOTE — ANESTHESIA PROCEDURE NOTES
Airway    Date/Time: 6/5/2024 12:10 PM    Performed by: Yulissa Mcginnis M.D.  Authorized by: Yulissa Mcginnis M.D.    Location:  OR  Urgency:  Elective  Indications for Airway Management:  Anesthesia      Spontaneous Ventilation: absent    Sedation Level:  Deep  Preoxygenated: Yes    Patient Position:  Sniffing  Mask Difficulty Assessment:  1 - vent by mask  Final Airway Type:  Endotracheal airway  Final Endotracheal Airway:  ETT  Cuffed: Yes    Technique Used for Successful ETT Placement:  Direct laryngoscopy    Insertion Site:  Oral  Blade Type:  Hermes  Laryngoscope Blade/Videolaryngoscope Blade Size:  3  ETT Size (mm):  7.0  Measured from:  Teeth  ETT to Teeth (cm):  22  Placement Verified by: auscultation and capnometry    Cormack-Lehane Classification:  Grade I - full view of glottis  Number of Attempts at Approach:  1

## 2024-06-10 LAB
Lab: NORMAL
NTRA 1P36 DELETION SYNDROME POPULATION-BASED RISK TEXT: NORMAL
NTRA 1P36 DELETION SYNDROME RESULT TEXT: NORMAL
NTRA 1P36 DELETION SYNDROME RISK SCORE TEXT: NORMAL
NTRA 22Q11.2 DELETION SYNDROME POPULATION-BASED RISK TEXT: NORMAL
NTRA 22Q11.2 DELETION SYNDROME RESULT TEXT: NORMAL
NTRA 22Q11.2 DELETION SYNDROME RISK SCORE TEXT: NORMAL
NTRA ANGELMAN SYNDROME POPULATION-BASED RISK TEXT: NORMAL
NTRA ANGELMAN SYNDROME RESULT TEXT: NORMAL
NTRA ANGELMAN SYNDROME RISK SCORE TEXT: NORMAL
NTRA CRI-DU-CHAT SYNDROME POPULATION-BASED RISK TEXT: NORMAL
NTRA CRI-DU-CHAT SYNDROME RESULT TEXT: NORMAL
NTRA CRI-DU-CHAT SYNDROME RISK SCORE TEXT: NORMAL
NTRA FETAL FRACTION: NORMAL
NTRA GENDER OF FETUS: NORMAL
NTRA MONOSOMY X AGE-BASED RISK TEXT: NORMAL
NTRA MONOSOMY X RESULT TEXT: NORMAL
NTRA MONOSOMY X RISK SCORE TEXT: NORMAL
NTRA PRADER-WILLI SYNDROME POPULATION-BASED RISK TEXT: NORMAL
NTRA PRADER-WILLI SYNDROME RESULT TEXT: NORMAL
NTRA PRADER-WILLI SYNDROME RISK SCORE TEXT: NORMAL
NTRA TRIPLOIDY RESULT TEXT: NORMAL
NTRA TRISOMY 13 AGE-BASED RISK TEXT: NORMAL
NTRA TRISOMY 13 RESULT TEXT: NORMAL
NTRA TRISOMY 13 RISK SCORE TEXT: NORMAL
NTRA TRISOMY 18 AGE-BASED RISK TEXT: NORMAL
NTRA TRISOMY 18 RESULT TEXT: NORMAL
NTRA TRISOMY 18 RISK SCORE TEXT: NORMAL
NTRA TRISOMY 21 AGE-BASED RISK TEXT: NORMAL
NTRA TRISOMY 21 RESULT TEXT: NORMAL
NTRA TRISOMY 21 RISK SCORE TEXT: NORMAL

## 2024-06-11 ENCOUNTER — APPOINTMENT (OUTPATIENT)
Dept: OBGYN | Facility: CLINIC | Age: 29
End: 2024-06-11
Payer: COMMERCIAL

## 2024-06-11 VITALS — WEIGHT: 239 LBS | DIASTOLIC BLOOD PRESSURE: 86 MMHG | SYSTOLIC BLOOD PRESSURE: 116 MMHG | BODY MASS INDEX: 38.58 KG/M2

## 2024-06-11 DIAGNOSIS — Z34.02 ENCOUNTER FOR SUPERVISION OF NORMAL FIRST PREGNANCY IN SECOND TRIMESTER: ICD-10-CM

## 2024-06-11 PROCEDURE — 0502F SUBSEQUENT PRENATAL CARE: CPT | Performed by: OBSTETRICS & GYNECOLOGY

## 2024-06-11 NOTE — PROGRESS NOTES
OB Followup;    14w0d    Patient Active Problem List    Diagnosis Date Noted    PONV (postoperative nausea and vomiting) 2024    Prenatal care, subsequent pregnancy 2024    Left ovarian cyst 2024       Vitals:    24 1518   BP: 116/86   Weight: 239 lb       Patient presents for followup of OB care. Currently doing well . Good fetal movement no leakage of fluid no contractions or vaginal bleeding        Size equals dates, normal fetal heart rate    Transgender couple-patient stopped hormone therapy to conceive as well as his partner      Patient underwent laparoscopic left salpingo-oophorectomy for mature teratoma on 2024 and patient is doing well only using Tylenol infrequently at this time  Laparoscopic incisions are healing well    Referral given for 20-week ultrasound    Labor precautions given  Discussed proper weight gain during pregnancy.    Signs and symptoms of labor/ labor discussed   Discussed proper exercise during pregnancy  Discussed proper oral fluid hydration  Reviewed fetal kick counts and appropriate fetal movement during pregnancy  Reviewed postpartum birth control methods  All questions answered in detail    Followup in  4 weeks

## 2024-07-11 ENCOUNTER — ROUTINE PRENATAL (OUTPATIENT)
Dept: OBGYN | Facility: CLINIC | Age: 29
End: 2024-07-11
Payer: COMMERCIAL

## 2024-07-11 VITALS — SYSTOLIC BLOOD PRESSURE: 114 MMHG | WEIGHT: 245.4 LBS | BODY MASS INDEX: 39.61 KG/M2 | DIASTOLIC BLOOD PRESSURE: 81 MMHG

## 2024-07-11 DIAGNOSIS — Z3A.18 18 WEEKS GESTATION OF PREGNANCY: ICD-10-CM

## 2024-07-11 DIAGNOSIS — Z34.02 ENCOUNTER FOR SUPERVISION OF NORMAL FIRST PREGNANCY IN SECOND TRIMESTER: ICD-10-CM

## 2024-07-11 DIAGNOSIS — Z78.9 TRANSGENDER: ICD-10-CM

## 2024-07-11 PROCEDURE — 0502F SUBSEQUENT PRENATAL CARE: CPT | Performed by: OBSTETRICS & GYNECOLOGY

## 2024-07-23 ENCOUNTER — HOSPITAL ENCOUNTER (OUTPATIENT)
Dept: RADIOLOGY | Facility: MEDICAL CENTER | Age: 29
End: 2024-07-23
Attending: OBSTETRICS & GYNECOLOGY
Payer: COMMERCIAL

## 2024-07-23 DIAGNOSIS — Z34.02 ENCOUNTER FOR SUPERVISION OF NORMAL FIRST PREGNANCY IN SECOND TRIMESTER: ICD-10-CM

## 2024-07-23 PROCEDURE — 76805 OB US >/= 14 WKS SNGL FETUS: CPT

## 2024-08-05 ENCOUNTER — APPOINTMENT (OUTPATIENT)
Dept: OBGYN | Facility: CLINIC | Age: 29
End: 2024-08-05
Payer: COMMERCIAL

## 2024-08-06 ENCOUNTER — ROUTINE PRENATAL (OUTPATIENT)
Dept: OBGYN | Facility: CLINIC | Age: 29
End: 2024-08-06
Payer: COMMERCIAL

## 2024-08-06 VITALS — WEIGHT: 247.8 LBS | DIASTOLIC BLOOD PRESSURE: 79 MMHG | SYSTOLIC BLOOD PRESSURE: 118 MMHG | BODY MASS INDEX: 40 KG/M2

## 2024-08-06 DIAGNOSIS — Z34.02 ENCOUNTER FOR SUPERVISION OF NORMAL FIRST PREGNANCY IN SECOND TRIMESTER: ICD-10-CM

## 2024-08-06 DIAGNOSIS — N83.202 LEFT OVARIAN CYST: ICD-10-CM

## 2024-08-06 PROCEDURE — 0502F SUBSEQUENT PRENATAL CARE: CPT | Performed by: OBSTETRICS & GYNECOLOGY

## 2024-08-06 NOTE — PROGRESS NOTES
Pt here today for OBFV   +FM movemnet  No VB, LOF. 's   Phone number [phone]  Pharmacy verified   Pt states  want to talk about travel ,us results

## 2024-08-06 NOTE — PROGRESS NOTES
OB Visit Note - 22w0d     MEDICAL DECISION MAKING:  Bora Rodrigez is a 29 y.o. adult (trans male)  at 22w0d    Today's visit addressed:   Doing well. Some mild low back pain. No VB or LOF. + FM.   Planning to take 3 week road trip for 'babymoon' with SO. Driving up [place] and [facility], to [place] and back through [facility].     Pregnancy complicated by:  Patient Active Problem List   Diagnosis    Prenatal care, subsequent pregnancy    Left ovarian cyst    PONV (postoperative nausea and vomiting)   Anatomy scan reviewed - repeat US ordered to visualize face/LVOT.     Travel precautions reviewed - increased risk of DVT/thrombosis during pregnancy, so advised frequent rests on drive every 1-2 hours, support stockings, adequate hydration and frequent meals. May consider daily asa during trip. Advised to be aware of all hospitals along the route.  labor precautions reviewed.     The patient will follow up in 4 week(s). She was counseled to call or return for vaginal bleeding, regular contractions, leakage of fluid.    Juliocesar Lui M.D.

## 2024-08-08 ENCOUNTER — HOSPITAL ENCOUNTER (OUTPATIENT)
Dept: RADIOLOGY | Facility: MEDICAL CENTER | Age: 29
End: 2024-08-08
Attending: OBSTETRICS & GYNECOLOGY
Payer: COMMERCIAL

## 2024-08-08 DIAGNOSIS — Z34.02 ENCOUNTER FOR SUPERVISION OF NORMAL FIRST PREGNANCY IN SECOND TRIMESTER: ICD-10-CM

## 2024-08-08 PROCEDURE — 76815 OB US LIMITED FETUS(S): CPT

## 2024-09-11 ENCOUNTER — ROUTINE PRENATAL (OUTPATIENT)
Dept: OBGYN | Facility: CLINIC | Age: 29
End: 2024-09-11
Payer: COMMERCIAL

## 2024-09-11 VITALS — BODY MASS INDEX: 40.03 KG/M2 | WEIGHT: 248 LBS | SYSTOLIC BLOOD PRESSURE: 129 MMHG | DIASTOLIC BLOOD PRESSURE: 90 MMHG

## 2024-09-11 DIAGNOSIS — Z34.02 ENCOUNTER FOR SUPERVISION OF NORMAL FIRST PREGNANCY IN SECOND TRIMESTER: ICD-10-CM

## 2024-09-11 DIAGNOSIS — Z34.82 PRENATAL CARE, SUBSEQUENT PREGNANCY IN SECOND TRIMESTER: ICD-10-CM

## 2024-09-11 DIAGNOSIS — Z78.9 TRANSGENDER: ICD-10-CM

## 2024-09-11 PROCEDURE — 0502F SUBSEQUENT PRENATAL CARE: CPT | Performed by: STUDENT IN AN ORGANIZED HEALTH CARE EDUCATION/TRAINING PROGRAM

## 2024-09-11 NOTE — PROGRESS NOTES
S: 29 y.o.  at 27w1d presents for routine obstetric follow-up.   Good fetal movement.  No contractions, vaginal bleeding, or leakage of fluid.    Questions answered.    O: LMP 2024   Patients' weight gain, fluid intake and exercise level discussed.  Vitals, fundal height , fetal position, and FHR reviewed on flowsheet    Lab:No results found for this or any previous visit (from the past 336 hour(s)).  Recent US: 2024 ROMMEL: 10.15. Cervical length: 3.93. VERTEX.  EFW: 67%, 349g.  TDaP: offer at next appt  RSV: Educated on RSV vaccine at 32-36w  Rh: negative- needs rhogam at next appt    A/P:  29 y.o.  at 27w1d presents for routine obstetric follow-up.  Size equals dates and/or scan    - 3T labs ordered  - needs rhogam and tdap at next appt   - S/sx pregnancy and labor warning signs vs general discomforts discussed  - Fetal movements and/or kick counts reviewed   - Adequate hydration reinforced  - Nutrition/exercise/vitamin education; continue PNV  - Encouraged tour of LnD/childbirth education classes: contact info provided   - Anticipatory guidance given    Follow-up in 2 weeks.    Lakia Panchal PAngelA.-C.  [facility]

## 2024-09-11 NOTE — LETTER
"Count Your Baby's Movements  Another step to a healthy delivery    Bora Rodrigez  [facility]  Dept: [phone]    How Many Weeks Pregnant? 27w1d    Date to Begin Countin2024              How to use this chart    One way for your physician to keep track of your baby's health is by knowing how often the baby moves (or \"kicks\") in your womb.  You can help your physician to do this by using this chart every day.    Every day, you should see how many hours it takes for your baby to move 10 times.  Start in the morning, as soon as you get up.    First, write down the time your baby moves until you get to 10.  Check off one box every time your baby moves until you get to 10.  Write down the time you finished counting in the last column.  Total how long it took to count up all 10 movements.  Finally, fill in the box that shows how long this took.  After counting 10 movements, you no longer have to count any more that day.  The next morning, just start counting again as soon as you get up.    What should you call a \"movement\"?  It is hard to say, because it will feel different from one mother to another and from one pregnancy to the next.  The important thing is that you count the movements the same way throughout your pregnancy.  If you have more questions, you should ask your physician.    Count carefully every day!  SAMPLE:  Week 28    How many hours did it take to feel 10 movements?       Start  Time     1     2     3     4     5     6     7     8     9     10   Finish Time   Mon 8:20           11:40                                 Fri               Sat               Sun                 IMPORTANT: You should contact your physician if it takes more than two hours for you to feel 10 movements.  Each morning, write down the time and start to count the movements of your baby.  Keep track by checking off one box every time you feel one movement.  When you " "have felt 10 \"kicks\", write down the time you finished counting in the last column.  Then fill in the   box (over the check zay) for the number of hours it took.  Be sure to read the complete instructions on the previous page.            "

## 2024-09-24 ENCOUNTER — HOSPITAL ENCOUNTER (OUTPATIENT)
Dept: LAB | Facility: MEDICAL CENTER | Age: 29
End: 2024-09-24
Attending: STUDENT IN AN ORGANIZED HEALTH CARE EDUCATION/TRAINING PROGRAM
Payer: COMMERCIAL

## 2024-09-24 DIAGNOSIS — Z34.02 ENCOUNTER FOR SUPERVISION OF NORMAL FIRST PREGNANCY IN SECOND TRIMESTER: ICD-10-CM

## 2024-09-24 LAB
ERYTHROCYTE [DISTWIDTH] IN BLOOD BY AUTOMATED COUNT: 44.9 FL (ref 35.9–50)
HCT VFR BLD AUTO: 33.3 % (ref 37–47)
HGB BLD-MCNC: 11.1 G/DL (ref 12–16)
MCH RBC QN AUTO: 28.7 PG (ref 27–33)
MCHC RBC AUTO-ENTMCNC: 33.3 G/DL (ref 32.2–35.5)
MCV RBC AUTO: 86 FL (ref 81.4–97.8)
PLATELET # BLD AUTO: 159 K/UL (ref 164–446)
PMV BLD AUTO: 11 FL (ref 9–12.9)
RBC # BLD AUTO: 3.87 M/UL (ref 4.2–5.4)
T PALLIDUM AB SER QL IA: NONREACTIVE
WBC # BLD AUTO: 4.2 K/UL (ref 4.8–10.8)

## 2024-09-24 PROCEDURE — 86780 TREPONEMA PALLIDUM: CPT

## 2024-09-24 PROCEDURE — 36415 COLL VENOUS BLD VENIPUNCTURE: CPT

## 2024-09-24 PROCEDURE — 82950 GLUCOSE TEST: CPT

## 2024-09-24 PROCEDURE — 85027 COMPLETE CBC AUTOMATED: CPT

## 2024-09-25 LAB — GLUCOSE 1H P 50 G GLC PO SERPL-MCNC: 104 MG/DL (ref 70–139)

## 2024-09-30 ENCOUNTER — ROUTINE PRENATAL (OUTPATIENT)
Dept: OBGYN | Facility: CLINIC | Age: 29
End: 2024-09-30
Payer: COMMERCIAL

## 2024-09-30 VITALS — WEIGHT: 249 LBS | BODY MASS INDEX: 40.19 KG/M2 | DIASTOLIC BLOOD PRESSURE: 78 MMHG | SYSTOLIC BLOOD PRESSURE: 122 MMHG

## 2024-09-30 DIAGNOSIS — O26.899 RH NEGATIVE STATE IN ANTEPARTUM PERIOD: ICD-10-CM

## 2024-09-30 DIAGNOSIS — Z67.91 RH NEGATIVE STATE IN ANTEPARTUM PERIOD: ICD-10-CM

## 2024-09-30 DIAGNOSIS — Z23 NEED FOR TDAP VACCINATION: ICD-10-CM

## 2024-09-30 NOTE — PROGRESS NOTES
Bora Rodrigez    Patient Active Problem List    Diagnosis Date Noted    PONV (postoperative nausea and vomiting) 2024    Prenatal care, subsequent pregnancy 2024    Left ovarian cyst 2024       S: 29 y.o.  at 29w6d presents for routine obstetric follow-up.   Good fetal movement.  3T labs WNL.   No contractions, vaginal bleeding, or leakage of fluid.    Questions answered.    O: /78   Wt 249 lb   LMP 2024   BMI 40.19 kg/m²   Patients' weight gain, fluid intake and exercise level discussed.  Vitals, fundal height , fetal position, and FHR reviewed on flowsheet    Lab:  Recent Results (from the past 336 hour(s))   T.PALLIDUM AB KATELYN (SCREENING)    Collection Time: 24  8:44 AM   Result Value Ref Range    Syphilis, Treponemal Qual Nonreactive Non-Reactive   GLUCOSE 1HR GESTATIONAL    Collection Time: 24  8:44 AM   Result Value Ref Range    Glucose, Post Dose 104 70 - 139 mg/dL   CBC WITHOUT DIFFERENTIAL    Collection Time: 24  8:44 AM   Result Value Ref Range    WBC 4.2 (L) 4.8 - 10.8 K/uL    RBC 3.87 (L) 4.20 - 5.40 M/uL    Hemoglobin 11.1 (L) 12.0 - 16.0 g/dL    Hematocrit 33.3 (L) 37.0 - 47.0 %    MCV 86.0 81.4 - 97.8 fL    MCH 28.7 27.0 - 33.0 pg    MCHC 33.3 32.2 - 35.5 g/dL    RDW 44.9 35.9 - 50.0 fL    Platelet Count 159 (L) 164 - 446 K/uL    MPV 11.0 9.0 - 12.9 fL     Recent US: 2024 ROMMEL: 10.15. Cervical length: 3.93. VERTEX.  EFW: 67%, 349g.  TDaP: administered 2024  RSV: Educated on RSV vaccine at 32-36w  Rh: negative- rhogam administered today     A/P:  29 y.o.  at 29w6d presents for routine obstetric follow-up.  Size equals dates and/or scan    - 3T labs WNL  - Tdap administered toda  - Rhogam administered today  - S/sx pregnancy and labor warning signs vs general discomforts discussed  - Fetal movements and/or kick counts reviewed   - Adequate hydration reinforced  - Nutrition/exercise/vitamin education; continue PNV  -  Encouraged tour of LnD/childbirth education classes: contact info provided   - Anticipatory guidance given    Follow-up in 2 weeks.    Lakia Panchal P.A.-C.  [facility]

## 2024-09-30 NOTE — PROGRESS NOTES
OB F/U  PT REPORTS + FETAL MOVEMENT  DENIES VB, LOF, OR UC'S  PHONE # VERIFIED  PHARMACY VERIFIED    PT REPORTS POSSIBLE CONTRACTIONS  TDAP AND RHOGAM TODAY PER PT AND PROVIDER CONSENT

## 2024-10-11 ENCOUNTER — ROUTINE PRENATAL (OUTPATIENT)
Dept: OBGYN | Facility: CLINIC | Age: 29
End: 2024-10-11
Payer: COMMERCIAL

## 2024-10-11 VITALS — SYSTOLIC BLOOD PRESSURE: 136 MMHG | WEIGHT: 256.6 LBS | BODY MASS INDEX: 41.42 KG/M2 | DIASTOLIC BLOOD PRESSURE: 85 MMHG

## 2024-10-11 DIAGNOSIS — Z34.83 PRENATAL CARE, SUBSEQUENT PREGNANCY IN THIRD TRIMESTER: ICD-10-CM

## 2024-10-11 DIAGNOSIS — O26.843 UTERINE SIZE-DATE DISCREPANCY IN THIRD TRIMESTER: ICD-10-CM

## 2024-10-11 PROCEDURE — 0502F SUBSEQUENT PRENATAL CARE: CPT | Performed by: MIDWIFE

## 2024-10-28 ENCOUNTER — ROUTINE PRENATAL (OUTPATIENT)
Dept: OBGYN | Facility: CLINIC | Age: 29
End: 2024-10-28
Payer: COMMERCIAL

## 2024-10-28 VITALS — DIASTOLIC BLOOD PRESSURE: 82 MMHG | SYSTOLIC BLOOD PRESSURE: 118 MMHG | WEIGHT: 254 LBS | BODY MASS INDEX: 41 KG/M2

## 2024-10-28 DIAGNOSIS — Z29.11 NEED FOR RSV VACCINATION: ICD-10-CM

## 2024-10-28 DIAGNOSIS — Z34.83 PRENATAL CARE, SUBSEQUENT PREGNANCY IN THIRD TRIMESTER: ICD-10-CM

## 2024-11-03 ENCOUNTER — APPOINTMENT (OUTPATIENT)
Dept: RADIOLOGY | Facility: MEDICAL CENTER | Age: 29
End: 2024-11-03
Attending: MIDWIFE
Payer: COMMERCIAL

## 2024-11-03 DIAGNOSIS — O26.843 UTERINE SIZE-DATE DISCREPANCY IN THIRD TRIMESTER: ICD-10-CM

## 2024-11-03 DIAGNOSIS — Z34.83 PRENATAL CARE, SUBSEQUENT PREGNANCY IN THIRD TRIMESTER: ICD-10-CM

## 2024-11-03 PROCEDURE — 76816 OB US FOLLOW-UP PER FETUS: CPT

## 2024-11-07 NOTE — RESULT ENCOUNTER NOTE
Please have patient scheduled with [facility] for another growth scan to confirm findings.

## 2024-11-11 ENCOUNTER — ROUTINE PRENATAL (OUTPATIENT)
Dept: OBGYN | Facility: CLINIC | Age: 29
End: 2024-11-11
Payer: COMMERCIAL

## 2024-11-11 ENCOUNTER — HOSPITAL ENCOUNTER (OUTPATIENT)
Facility: MEDICAL CENTER | Age: 29
End: 2024-11-11
Payer: COMMERCIAL

## 2024-11-11 VITALS — WEIGHT: 255 LBS | DIASTOLIC BLOOD PRESSURE: 87 MMHG | SYSTOLIC BLOOD PRESSURE: 125 MMHG | BODY MASS INDEX: 41.16 KG/M2

## 2024-11-11 DIAGNOSIS — Z34.83 PRENATAL CARE, SUBSEQUENT PREGNANCY IN THIRD TRIMESTER: ICD-10-CM

## 2024-11-11 PROCEDURE — 87081 CULTURE SCREEN ONLY: CPT

## 2024-11-11 PROCEDURE — 87150 DNA/RNA AMPLIFIED PROBE: CPT

## 2024-11-11 PROCEDURE — 0502F SUBSEQUENT PRENATAL CARE: CPT

## 2024-11-11 NOTE — PROGRESS NOTES
S: Pt is a 29 y.o.  at 35w6d gestation here today for routine prenatal care.     Concerns today include:  Denies concerns    Denies: vaginal bleeding, pelvic and abdominal pain, cramping, contractions, leaking of fluid, urinary and vaginal symptoms and headaches, visual changes, epigastric pain    Pt reports fetal movement as Present     O: /87   Wt 255 lb   LMP 2024   BMI 41.16 kg/m²    Patients' weight gain, fluid intake and exercise level discussed.  Vitals, fundal height , fetal position, and FHR reviewed on flowsheet  *see prenatal flowsheet*     Labs:     Prenatal labs: Completed and normal  GCT: 104   GBS: Collected today  Genetic testing: NIPT low risk   STI testing: negative    Recent US: 2024 ROMMEL: 13.30. Cervical length: not seen. EFW 3255g 98%.     A/P:     Problem List Items Addressed This Visit          Women's Health Problems    Prenatal care, subsequent pregnancy     Pt is a 29 y.o.  at 35w6d gestation here today for routine prenatal care.   Size equal to dates    Discuss Labor and pre-eclampsia precautions.  Discuss FMA and FKCs  Address concerns: denies  Discussed IOL before due date due to US- 98% EFW  GBS Collected today  Rh negative. Rhogam administered  .  Tdap: Administered    RSV admistered  Reviewed 3rd tri labs: normal  Labs ordered: GBS  Imaging ordered: none   Fetal movements and/or kick counts reviewed    Adequate hydration reinforced   Nutrition/exercise/vitamin education; continue PNV   Encouraged tour of LnD/childbirth education classes: contact info provided    Anticipatory guidance given   RTC 1 wks.

## 2024-11-11 NOTE — PROGRESS NOTES
Patient here for OB Follow Up visit - 35 weeks 6 days  Patient reports GOOD fetal movement  Patient denies VB, LOF and UC's  GBS to be done today    Ultrasound Done: 11/3/2024 - measuring larger (per US EDC 11/21/24)  Labs up to date: Yes  [facility]: [facility] [place], Tdap 9/30/24 and RSV done 10/28/24  Phone # Verified: [phone]  Pharmacy Confirmed:  [facility] [place]

## 2024-11-11 NOTE — ASSESSMENT & PLAN NOTE
Pt is a 29 y.o.  at 35w6d gestation here today for routine prenatal care.   Size equal to dates    Discuss Labor and pre-eclampsia precautions.  Discuss FMA and FKCs  Address concerns: denies  Discussed IOL before due date due to US- 98% EFW  GBS Collected today  Rh negative. Rhogam administered  .  Tdap: Administered    RSV admistered  Reviewed 3rd tri labs: normal  Labs ordered: GBS  Imaging ordered: none   Fetal movements and/or kick counts reviewed    Adequate hydration reinforced   Nutrition/exercise/vitamin education; continue PNV   Encouraged tour of LnD/childbirth education classes: contact info provided    Anticipatory guidance given   RTC 1 wks.

## 2024-11-12 LAB — GP B STREP DNA SPEC QL NAA+PROBE: POSITIVE

## 2024-11-18 ENCOUNTER — ROUTINE PRENATAL (OUTPATIENT)
Dept: OBGYN | Facility: CLINIC | Age: 29
End: 2024-11-18
Payer: COMMERCIAL

## 2024-11-18 VITALS — DIASTOLIC BLOOD PRESSURE: 80 MMHG | WEIGHT: 258 LBS | BODY MASS INDEX: 41.64 KG/M2 | SYSTOLIC BLOOD PRESSURE: 131 MMHG

## 2024-11-18 DIAGNOSIS — Z78.9 TRANSGENDER: ICD-10-CM

## 2024-11-18 DIAGNOSIS — O26.899 RH NEGATIVE STATE IN ANTEPARTUM PERIOD: ICD-10-CM

## 2024-11-18 DIAGNOSIS — O99.210 OBESITY IN PREGNANCY: ICD-10-CM

## 2024-11-18 DIAGNOSIS — O36.63X1 EXCESSIVE FETAL GROWTH AFFECTING MANAGEMENT OF PREGNANCY IN THIRD TRIMESTER, FETUS 1 OF MULTIPLE GESTATION: ICD-10-CM

## 2024-11-18 DIAGNOSIS — Z67.91 RH NEGATIVE STATE IN ANTEPARTUM PERIOD: ICD-10-CM

## 2024-11-18 PROCEDURE — 0502F SUBSEQUENT PRENATAL CARE: CPT | Performed by: STUDENT IN AN ORGANIZED HEALTH CARE EDUCATION/TRAINING PROGRAM

## 2024-11-18 NOTE — PROGRESS NOTES
Bora Rodrigez    Patient Active Problem List    Diagnosis Date Noted    PONV (postoperative nausea and vomiting) 2024    Prenatal care, subsequent pregnancy 2024    Left ovarian cyst 2024       S: 29 y.o.  at 36w6d presents for routine obstetric follow-up.   Good fetal movement.  No contractions, vaginal bleeding, or leakage of fluid.    Questions answered.    O: /80   Wt 258 lb   LMP 2024   BMI 41.64 kg/m²   Patients' weight gain, fluid intake and exercise level discussed.  Vitals, fundal height , fetal position, and FHR reviewed on flowsheet    Lab:  Recent Results (from the past 2 weeks)   GRP B STREP, BY PCR (RIVERA BROTH)    Collection Time: 24 10:35 AM    Specimen: Genital   Result Value Ref Range    Strep Gp B DNA PCR POSITIVE (A) Negative     Recent US: 11/3/2024 ROMMEL: 13.30. FHR: 136 bpm; Vertex lie; EFW: >98%, 3255g.  TDaP: Administered 2024  Flu: received at outside facility   RSV: Educated on RSV vaccine at 32-36w  GBS: Positive  Rh: negative    A/P:  29 y.o.  at 36w6d presents for routine obstetric follow-up.  Size equals dates and/or scan    - Last EFW >98%-discussed that suspected macrosomia is not an indication for Induction. However, we did discuss that with a BMI of 41.64 this can be an indication for IOL. At this time would like to hold off on an induction.   - GBS +  - S/sx pregnancy and labor warning signs vs general discomforts discussed  - Fetal movements and/or kick counts reviewed   - Adequate hydration reinforced  - Nutrition/exercise/vitamin education; continue PNV  - Encouraged tour of LnD/childbirth education classes: contact info provided   - Anticipatory guidance given    Follow-up in 1 weeks.    Lakia Panchal P.A.-C.  [facility]

## 2024-11-26 ENCOUNTER — ROUTINE PRENATAL (OUTPATIENT)
Dept: OBGYN | Facility: CLINIC | Age: 29
End: 2024-11-26
Payer: COMMERCIAL

## 2024-11-26 VITALS — WEIGHT: 258.2 LBS | SYSTOLIC BLOOD PRESSURE: 127 MMHG | BODY MASS INDEX: 41.67 KG/M2 | DIASTOLIC BLOOD PRESSURE: 89 MMHG

## 2024-11-26 DIAGNOSIS — O26.899 RH NEGATIVE STATE IN ANTEPARTUM PERIOD: ICD-10-CM

## 2024-11-26 DIAGNOSIS — Z78.9 TRANSGENDER: ICD-10-CM

## 2024-11-26 DIAGNOSIS — O99.210 OBESITY IN PREGNANCY: ICD-10-CM

## 2024-11-26 DIAGNOSIS — O36.63X1 EXCESSIVE FETAL GROWTH AFFECTING MANAGEMENT OF PREGNANCY IN THIRD TRIMESTER, FETUS 1 OF MULTIPLE GESTATION: ICD-10-CM

## 2024-11-26 DIAGNOSIS — Z67.91 RH NEGATIVE STATE IN ANTEPARTUM PERIOD: ICD-10-CM

## 2024-11-26 PROBLEM — O36.63X0 EXCESSIVE FETAL GROWTH AFFECTING MANAGEMENT OF MOTHER IN THIRD TRIMESTER, ANTEPARTUM: Status: ACTIVE | Noted: 2024-11-26

## 2024-11-26 NOTE — PROGRESS NOTES
Bora Rodrigez    Patient Active Problem List    Diagnosis Date Noted    PONV (postoperative nausea and vomiting) 2024    Prenatal care, subsequent pregnancy 2024    Left ovarian cyst 2024       S: 29 y.o.  at 38w0d presents for routine obstetric follow-up.   Good fetal movement.  C/o lower pelvic pain and bilateral hip pain.   No contractions, vaginal bleeding, or leakage of fluid.    Questions answered.    O: /89   Wt 258 lb 3.2 oz   LMP 2024   BMI 41.67 kg/m²   Patients' weight gain, fluid intake and exercise level discussed.  Vitals, fundal height , fetal position, and FHR reviewed on flowsheet    Lab:No results found for this or any previous visit (from the past 2 weeks).  Recent US: 11/3/2024 ROMMEL: 13.30. FHR: 136 bpm; Vertex lie; EFW: >98%, 3255g.  TDaP: Administered 2024  Flu: received at outside facility   RSV: Educated on RSV vaccine at 32-36w  GBS: Positive  Rh: negative    A/P:  29 y.o.  at 38w0d presents for routine obstetric follow-up.  Size equals dates and/or scan    - Last EFW >98%-discussed that suspected macrosomia is not an indication for Induction. However, we did discuss that with a BMI of 41.64 this can be an indication for IOL. At this time would like to hold off on an induction.   - GBS +  - S/sx pregnancy and labor warning signs vs general discomforts discussed  - Fetal movements and/or kick counts reviewed   - Adequate hydration reinforced  - Nutrition/exercise/vitamin education; continue PNV  - Encouraged tour of LnD/childbirth education classes: contact info provided   - Anticipatory guidance given    Follow-up in 1 weeks.    Lakia Panchal P.A.-C.  [facility]

## 2024-11-26 NOTE — PROGRESS NOTES
Pt here today for OB follow up  Pt states stinging feeling in cervix, soreness in upper hips- lasts several hours. No UC or VB/LOF  Pt would like a cervical check  Reports +  Good # [phone]  Pharmacy Confirmed.

## 2024-12-02 ENCOUNTER — APPOINTMENT (OUTPATIENT)
Dept: OBGYN | Facility: CLINIC | Age: 29
End: 2024-12-02
Payer: COMMERCIAL

## 2024-12-02 ENCOUNTER — HOSPITAL ENCOUNTER (OUTPATIENT)
Dept: LAB | Facility: MEDICAL CENTER | Age: 29
End: 2024-12-02
Attending: STUDENT IN AN ORGANIZED HEALTH CARE EDUCATION/TRAINING PROGRAM
Payer: COMMERCIAL

## 2024-12-02 VITALS — WEIGHT: 260 LBS | DIASTOLIC BLOOD PRESSURE: 93 MMHG | SYSTOLIC BLOOD PRESSURE: 139 MMHG | BODY MASS INDEX: 41.97 KG/M2

## 2024-12-02 DIAGNOSIS — O16.3 ELEVATED BLOOD PRESSURE COMPLICATING PREGNANCY IN THIRD TRIMESTER, ANTEPARTUM: ICD-10-CM

## 2024-12-02 DIAGNOSIS — O99.210 OBESITY IN PREGNANCY: ICD-10-CM

## 2024-12-02 DIAGNOSIS — Z34.83 PRENATAL CARE, SUBSEQUENT PREGNANCY IN THIRD TRIMESTER: ICD-10-CM

## 2024-12-02 DIAGNOSIS — O36.63X0 EXCESSIVE FETAL GROWTH AFFECTING MANAGEMENT OF PREGNANCY IN THIRD TRIMESTER, SINGLE OR UNSPECIFIED FETUS: ICD-10-CM

## 2024-12-02 DIAGNOSIS — Z67.91 RH NEGATIVE STATE IN ANTEPARTUM PERIOD: ICD-10-CM

## 2024-12-02 DIAGNOSIS — Z78.9 TRANSGENDER: ICD-10-CM

## 2024-12-02 DIAGNOSIS — O26.899 RH NEGATIVE STATE IN ANTEPARTUM PERIOD: ICD-10-CM

## 2024-12-02 LAB
ALBUMIN SERPL BCP-MCNC: 3.7 G/DL (ref 3.2–4.9)
ALBUMIN/GLOB SERPL: 1.2 G/DL
ALP SERPL-CCNC: 106 U/L (ref 30–99)
ALT SERPL-CCNC: 15 U/L (ref 2–50)
ANION GAP SERPL CALC-SCNC: 11 MMOL/L (ref 7–16)
AST SERPL-CCNC: 18 U/L (ref 12–45)
BILIRUB SERPL-MCNC: 0.3 MG/DL (ref 0.1–1.5)
BUN SERPL-MCNC: 8 MG/DL (ref 8–22)
CALCIUM ALBUM COR SERPL-MCNC: 9.7 MG/DL (ref 8.5–10.5)
CALCIUM SERPL-MCNC: 9.5 MG/DL (ref 8.5–10.5)
CHLORIDE SERPL-SCNC: 102 MMOL/L (ref 96–112)
CO2 SERPL-SCNC: 22 MMOL/L (ref 20–33)
CREAT SERPL-MCNC: 0.82 MG/DL (ref 0.5–1.4)
ERYTHROCYTE [DISTWIDTH] IN BLOOD BY AUTOMATED COUNT: 45.1 FL (ref 35.9–50)
GFR SERPLBLD CREATININE-BSD FMLA CKD-EPI: 98 ML/MIN/1.73 M 2
GLOBULIN SER CALC-MCNC: 3.2 G/DL (ref 1.9–3.5)
GLUCOSE SERPL-MCNC: 124 MG/DL (ref 65–99)
HCT VFR BLD AUTO: 39.3 % (ref 37–47)
HGB BLD-MCNC: 12.7 G/DL (ref 12–16)
MCH RBC QN AUTO: 27.1 PG (ref 27–33)
MCHC RBC AUTO-ENTMCNC: 32.3 G/DL (ref 32.2–35.5)
MCV RBC AUTO: 84 FL (ref 81.4–97.8)
PLATELET # BLD AUTO: 167 K/UL (ref 164–446)
PMV BLD AUTO: 12.3 FL (ref 9–12.9)
POTASSIUM SERPL-SCNC: 4.1 MMOL/L (ref 3.6–5.5)
PROT SERPL-MCNC: 6.9 G/DL (ref 6–8.2)
RBC # BLD AUTO: 4.68 M/UL (ref 4.2–5.4)
SODIUM SERPL-SCNC: 135 MMOL/L (ref 135–145)
URATE SERPL-MCNC: 5.5 MG/DL (ref 1.9–8.2)
WBC # BLD AUTO: 7.1 K/UL (ref 4.8–10.8)

## 2024-12-02 PROCEDURE — 84550 ASSAY OF BLOOD/URIC ACID: CPT

## 2024-12-02 PROCEDURE — 3075F SYST BP GE 130 - 139MM HG: CPT | Performed by: STUDENT IN AN ORGANIZED HEALTH CARE EDUCATION/TRAINING PROGRAM

## 2024-12-02 PROCEDURE — 85027 COMPLETE CBC AUTOMATED: CPT

## 2024-12-02 PROCEDURE — 82570 ASSAY OF URINE CREATININE: CPT

## 2024-12-02 PROCEDURE — 3080F DIAST BP >= 90 MM HG: CPT | Performed by: STUDENT IN AN ORGANIZED HEALTH CARE EDUCATION/TRAINING PROGRAM

## 2024-12-02 PROCEDURE — 36415 COLL VENOUS BLD VENIPUNCTURE: CPT

## 2024-12-02 PROCEDURE — 80053 COMPREHEN METABOLIC PANEL: CPT

## 2024-12-02 PROCEDURE — 0502F SUBSEQUENT PRENATAL CARE: CPT | Performed by: STUDENT IN AN ORGANIZED HEALTH CARE EDUCATION/TRAINING PROGRAM

## 2024-12-02 PROCEDURE — 84156 ASSAY OF PROTEIN URINE: CPT

## 2024-12-02 PROCEDURE — 59025 FETAL NON-STRESS TEST: CPT | Performed by: STUDENT IN AN ORGANIZED HEALTH CARE EDUCATION/TRAINING PROGRAM

## 2024-12-02 NOTE — PROGRESS NOTES
OB F/U  PT REPORTS + FETAL MOVEMENT  DENIES VB, LOF, OR UC'S  PHONE # VERIFIED  PHARMACY VERIFIED    PT DESIRES CERVICAL CHECK TODAY

## 2024-12-02 NOTE — PROGRESS NOTES
Bora Rodrigez    Patient Active Problem List    Diagnosis Date Noted    Excessive fetal growth affecting management of mother in third trimester, antepartum 2024    Rh negative state in antepartum period 2024    Transgender 2024    Obesity in pregnancy 2024    PONV (postoperative nausea and vomiting) 2024    Prenatal care, subsequent pregnancy 2024    Left ovarian cyst 2024       S: 29 y.o.  at 38w6d presents for routine obstetric follow-up.   Good fetal movement.  Denies any contractions. Feels as if the baby has moved more centrally and a little lower.  No contractions, vaginal bleeding, or leakage of fluid.    Questions answered.    O: BP (!) 139/93   Wt 260 lb   LMP 2024   BMI 41.97 kg/m²   Patients' weight gain, fluid intake and exercise level discussed.  Vitals, fundal height , fetal position, and FHR reviewed on flowsheet    Lab:No results found for this or any previous visit (from the past 2 weeks).  Recent US: 11/3/2024 ROMMEL: 13.30. FHR: 136 bpm; Vertex lie; EFW: >98%, 3255g.  TDaP: Administered 2024  Flu: received at outside facility   RSV: Educated on RSV vaccine at 32-36w  GBS: Positive  Rh: negative    NST  Indication: Elevated HTN, BMI 41.97    NST Interpretation: Category 1  Baseline 140, reactive, Variability  6-25 BPM, Accelerations: Yes, Decelerations: No         A/P:  29 y.o.  at 38w6d presents for routine obstetric follow-up.  Size equals dates and/or scan    - GBS+  - Last EFW >98%-discussed that suspected macrosomia is not an indication for Induction. However, we did discuss that with a BMI of 41.97 this can be an indication for IOL. At this time would like to hold off on an induction. We did discuss the risks of holding off on IOL including increased risk of stillbirth. Will discuss with partner and if they decide they would like to place IOL will message me and I can place this.  - Due to multiple elevated blood  pressure readings here today PIH labs were ordered and patient will go get them done upon leaving the office today. Denies any symptoms of HA, VA changes, RUQ/epigastric pain, or increased swelling. Discussed if he develops these symptoms then needs to go immediately to L&D for further evaluation.   - NST reactive  - S/sx pregnancy and labor warning signs vs general discomforts discussed  - Fetal movements and/or kick counts reviewed   - Adequate hydration reinforced  - Nutrition/exercise/vitamin education; continue PNV  - Encouraged tour of LnD/childbirth education classes: contact info provided   - Anticipatory guidance given    Follow-up in 1 weeks.    Lakia Panchal P.A.-C.  [facility]

## 2024-12-03 LAB
CREAT UR-MCNC: 37.2 MG/DL
PROT UR-MCNC: <6 MG/DL (ref 0–15)
PROT/CREAT UR: 161 MG/G (ref 10–107)

## 2024-12-05 DIAGNOSIS — O99.210 OBESITY IN PREGNANCY: ICD-10-CM

## 2024-12-05 DIAGNOSIS — O16.3 ELEVATED BLOOD PRESSURE COMPLICATING PREGNANCY IN THIRD TRIMESTER, ANTEPARTUM: ICD-10-CM

## 2024-12-09 ENCOUNTER — HOSPITAL ENCOUNTER (INPATIENT)
Facility: MEDICAL CENTER | Age: 29
LOS: 4 days | End: 2024-12-13
Attending: OBSTETRICS & GYNECOLOGY | Admitting: OBSTETRICS & GYNECOLOGY
Payer: COMMERCIAL

## 2024-12-09 ENCOUNTER — APPOINTMENT (OUTPATIENT)
Dept: OBGYN | Facility: MEDICAL CENTER | Age: 29
End: 2024-12-09
Attending: OBSTETRICS & GYNECOLOGY
Payer: COMMERCIAL

## 2024-12-09 PROBLEM — Z34.90 ENCOUNTER FOR INDUCTION OF LABOR: Status: ACTIVE | Noted: 2024-12-09

## 2024-12-09 PROCEDURE — 86780 TREPONEMA PALLIDUM: CPT

## 2024-12-09 PROCEDURE — 770002 HCHG ROOM/CARE - OB PRIVATE (112)

## 2024-12-09 PROCEDURE — 36415 COLL VENOUS BLD VENIPUNCTURE: CPT

## 2024-12-09 PROCEDURE — 80053 COMPREHEN METABOLIC PANEL: CPT

## 2024-12-09 RX ORDER — ACETAMINOPHEN 500 MG
1000 TABLET ORAL
Status: DISCONTINUED | OUTPATIENT
Start: 2024-12-09 | End: 2024-12-11 | Stop reason: HOSPADM

## 2024-12-09 RX ORDER — TERBUTALINE SULFATE 1 MG/ML
0.25 INJECTION, SOLUTION SUBCUTANEOUS
Status: DISCONTINUED | OUTPATIENT
Start: 2024-12-09 | End: 2024-12-11 | Stop reason: HOSPADM

## 2024-12-09 RX ORDER — ONDANSETRON 4 MG/1
4 TABLET, ORALLY DISINTEGRATING ORAL EVERY 6 HOURS PRN
Status: DISCONTINUED | OUTPATIENT
Start: 2024-12-09 | End: 2024-12-11 | Stop reason: HOSPADM

## 2024-12-09 RX ORDER — CARBOPROST TROMETHAMINE 250 UG/ML
250 INJECTION, SOLUTION INTRAMUSCULAR
Status: DISCONTINUED | OUTPATIENT
Start: 2024-12-09 | End: 2024-12-11 | Stop reason: HOSPADM

## 2024-12-09 RX ORDER — SODIUM CHLORIDE, SODIUM LACTATE, POTASSIUM CHLORIDE, CALCIUM CHLORIDE 600; 310; 30; 20 MG/100ML; MG/100ML; MG/100ML; MG/100ML
INJECTION, SOLUTION INTRAVENOUS CONTINUOUS
Status: DISCONTINUED | OUTPATIENT
Start: 2024-12-09 | End: 2024-12-13 | Stop reason: HOSPADM

## 2024-12-09 RX ORDER — IBUPROFEN 800 MG/1
800 TABLET, FILM COATED ORAL
Status: DISCONTINUED | OUTPATIENT
Start: 2024-12-09 | End: 2024-12-11 | Stop reason: HOSPADM

## 2024-12-09 RX ORDER — OXYTOCIN 10 [USP'U]/ML
10 INJECTION, SOLUTION INTRAMUSCULAR; INTRAVENOUS
Status: DISCONTINUED | OUTPATIENT
Start: 2024-12-09 | End: 2024-12-11 | Stop reason: HOSPADM

## 2024-12-09 RX ORDER — ONDANSETRON 2 MG/ML
4 INJECTION INTRAMUSCULAR; INTRAVENOUS EVERY 6 HOURS PRN
Status: DISCONTINUED | OUTPATIENT
Start: 2024-12-09 | End: 2024-12-11 | Stop reason: HOSPADM

## 2024-12-09 RX ORDER — MISOPROSTOL 200 UG/1
800 TABLET ORAL
Status: DISCONTINUED | OUTPATIENT
Start: 2024-12-09 | End: 2024-12-11 | Stop reason: HOSPADM

## 2024-12-09 RX ORDER — LIDOCAINE HYDROCHLORIDE 10 MG/ML
20 INJECTION, SOLUTION INFILTRATION; PERINEURAL
Status: DISCONTINUED | OUTPATIENT
Start: 2024-12-09 | End: 2024-12-11 | Stop reason: HOSPADM

## 2024-12-09 SDOH — ECONOMIC STABILITY: TRANSPORTATION INSECURITY
IN THE PAST 12 MONTHS, HAS LACK OF RELIABLE TRANSPORTATION KEPT YOU FROM MEDICAL APPOINTMENTS, MEETINGS, WORK OR FROM GETTING THINGS NEEDED FOR DAILY LIVING?: NO

## 2024-12-09 SDOH — ECONOMIC STABILITY: TRANSPORTATION INSECURITY
IN THE PAST 12 MONTHS, HAS THE LACK OF TRANSPORTATION KEPT YOU FROM MEDICAL APPOINTMENTS OR FROM GETTING MEDICATIONS?: NO

## 2024-12-09 ASSESSMENT — SOCIAL DETERMINANTS OF HEALTH (SDOH)
WITHIN THE LAST YEAR, HAVE YOU BEEN KICKED, HIT, SLAPPED, OR OTHERWISE PHYSICALLY HURT BY YOUR PARTNER OR EX-PARTNER?: NO
WITHIN THE PAST 12 MONTHS, YOU WORRIED THAT YOUR FOOD WOULD RUN OUT BEFORE YOU GOT THE MONEY TO BUY MORE: NEVER TRUE
WITHIN THE LAST YEAR, HAVE YOU BEEN HUMILIATED OR EMOTIONALLY ABUSED IN OTHER WAYS BY YOUR PARTNER OR EX-PARTNER?: NO
WITHIN THE PAST 12 MONTHS, THE FOOD YOU BOUGHT JUST DIDN'T LAST AND YOU DIDN'T HAVE MONEY TO GET MORE: NEVER TRUE
WITHIN THE LAST YEAR, HAVE YOU BEEN AFRAID OF YOUR PARTNER OR EX-PARTNER?: NO
WITHIN THE LAST YEAR, HAVE TO BEEN RAPED OR FORCED TO HAVE ANY KIND OF SEXUAL ACTIVITY BY YOUR PARTNER OR EX-PARTNER?: NO
IN THE PAST 12 MONTHS, HAS THE ELECTRIC, GAS, OIL, OR WATER COMPANY THREATENED TO SHUT OFF SERVICE IN YOUR HOME?: NO

## 2024-12-09 ASSESSMENT — PATIENT HEALTH QUESTIONNAIRE - PHQ9
SUM OF ALL RESPONSES TO PHQ9 QUESTIONS 1 AND 2: 0
1. LITTLE INTEREST OR PLEASURE IN DOING THINGS: NOT AT ALL
2. FEELING DOWN, DEPRESSED, IRRITABLE, OR HOPELESS: NOT AT ALL

## 2024-12-09 ASSESSMENT — FIBROSIS 4 INDEX: FIB4 SCORE: 0.83

## 2024-12-10 ENCOUNTER — ANESTHESIA (OUTPATIENT)
Dept: OBGYN | Facility: MEDICAL CENTER | Age: 29
End: 2024-12-10
Payer: COMMERCIAL

## 2024-12-10 ENCOUNTER — ANESTHESIA EVENT (OUTPATIENT)
Dept: OBGYN | Facility: MEDICAL CENTER | Age: 29
End: 2024-12-10
Payer: COMMERCIAL

## 2024-12-10 LAB
ALBUMIN SERPL BCP-MCNC: 3.6 G/DL (ref 3.2–4.9)
ALBUMIN SERPL BCP-MCNC: 3.8 G/DL (ref 3.2–4.9)
ALBUMIN/GLOB SERPL: 1.1 G/DL
ALBUMIN/GLOB SERPL: 1.2 G/DL
ALP SERPL-CCNC: 112 U/L (ref 30–99)
ALP SERPL-CCNC: 112 U/L (ref 30–99)
ALT SERPL-CCNC: 12 U/L (ref 2–50)
ALT SERPL-CCNC: 13 U/L (ref 2–50)
ANION GAP SERPL CALC-SCNC: 13 MMOL/L (ref 7–16)
ANION GAP SERPL CALC-SCNC: 15 MMOL/L (ref 7–16)
AST SERPL-CCNC: 16 U/L (ref 12–45)
AST SERPL-CCNC: 23 U/L (ref 12–45)
BASOPHILS # BLD AUTO: 0.4 % (ref 0–1.8)
BASOPHILS # BLD: 0.03 K/UL (ref 0–0.12)
BILIRUB SERPL-MCNC: 0.4 MG/DL (ref 0.1–1.5)
BILIRUB SERPL-MCNC: <0.2 MG/DL (ref 0.1–1.5)
BUN SERPL-MCNC: 11 MG/DL (ref 8–22)
BUN SERPL-MCNC: 12 MG/DL (ref 8–22)
CALCIUM ALBUM COR SERPL-MCNC: 9.1 MG/DL (ref 8.5–10.5)
CALCIUM ALBUM COR SERPL-MCNC: 9.9 MG/DL (ref 8.5–10.5)
CALCIUM SERPL-MCNC: 8.9 MG/DL (ref 8.5–10.5)
CALCIUM SERPL-MCNC: 9.6 MG/DL (ref 8.5–10.5)
CHLORIDE SERPL-SCNC: 104 MMOL/L (ref 96–112)
CHLORIDE SERPL-SCNC: 99 MMOL/L (ref 96–112)
CO2 SERPL-SCNC: 17 MMOL/L (ref 20–33)
CO2 SERPL-SCNC: 21 MMOL/L (ref 20–33)
CREAT SERPL-MCNC: 0.72 MG/DL (ref 0.5–1.4)
CREAT SERPL-MCNC: 0.73 MG/DL (ref 0.5–1.4)
CREAT UR-MCNC: 225.24 MG/DL
EOSINOPHIL # BLD AUTO: 0.05 K/UL (ref 0–0.51)
EOSINOPHIL NFR BLD: 0.6 % (ref 0–6.9)
ERYTHROCYTE [DISTWIDTH] IN BLOOD BY AUTOMATED COUNT: 44.9 FL (ref 35.9–50)
ERYTHROCYTE [DISTWIDTH] IN BLOOD BY AUTOMATED COUNT: 45.3 FL (ref 35.9–50)
GFR SERPLBLD CREATININE-BSD FMLA CKD-EPI: 113 ML/MIN/1.73 M 2
GFR SERPLBLD CREATININE-BSD FMLA CKD-EPI: 115 ML/MIN/1.73 M 2
GLOBULIN SER CALC-MCNC: 3.3 G/DL (ref 1.9–3.5)
GLOBULIN SER CALC-MCNC: 3.3 G/DL (ref 1.9–3.5)
GLUCOSE SERPL-MCNC: 100 MG/DL (ref 65–99)
GLUCOSE SERPL-MCNC: 107 MG/DL (ref 65–99)
HCT VFR BLD AUTO: 38.4 % (ref 37–47)
HCT VFR BLD AUTO: 39.6 % (ref 37–47)
HGB BLD-MCNC: 12.8 G/DL (ref 12–16)
HGB BLD-MCNC: 13.1 G/DL (ref 12–16)
HOLDING TUBE BB 8507: NORMAL
IMM GRANULOCYTES # BLD AUTO: 0.06 K/UL (ref 0–0.11)
IMM GRANULOCYTES NFR BLD AUTO: 0.8 % (ref 0–0.9)
LYMPHOCYTES # BLD AUTO: 1.71 K/UL (ref 1–4.8)
LYMPHOCYTES NFR BLD: 21.5 % (ref 22–41)
MAGNESIUM SERPL-MCNC: 3.5 MG/DL (ref 1.5–2.5)
MCH RBC QN AUTO: 27.2 PG (ref 27–33)
MCH RBC QN AUTO: 27.7 PG (ref 27–33)
MCHC RBC AUTO-ENTMCNC: 33.1 G/DL (ref 32.2–35.5)
MCHC RBC AUTO-ENTMCNC: 33.3 G/DL (ref 32.2–35.5)
MCV RBC AUTO: 82.2 FL (ref 81.4–97.8)
MCV RBC AUTO: 83.1 FL (ref 81.4–97.8)
MONOCYTES # BLD AUTO: 0.55 K/UL (ref 0–0.85)
MONOCYTES NFR BLD AUTO: 6.9 % (ref 0–13.4)
NEUTROPHILS # BLD AUTO: 5.54 K/UL (ref 1.82–7.42)
NEUTROPHILS NFR BLD: 69.8 % (ref 44–72)
NRBC # BLD AUTO: 0 K/UL
NRBC BLD-RTO: 0 /100 WBC (ref 0–0.2)
PLATELET # BLD AUTO: 166 K/UL (ref 164–446)
PLATELET # BLD AUTO: 186 K/UL (ref 164–446)
PMV BLD AUTO: 11.5 FL (ref 9–12.9)
PMV BLD AUTO: 11.8 FL (ref 9–12.9)
POTASSIUM SERPL-SCNC: 3.8 MMOL/L (ref 3.6–5.5)
POTASSIUM SERPL-SCNC: 4 MMOL/L (ref 3.6–5.5)
PROT SERPL-MCNC: 6.9 G/DL (ref 6–8.2)
PROT SERPL-MCNC: 7.1 G/DL (ref 6–8.2)
PROT UR-MCNC: 23 MG/DL (ref 0–15)
PROT/CREAT UR: 102 MG/G (ref 10–107)
RBC # BLD AUTO: 4.62 M/UL (ref 4.2–5.4)
RBC # BLD AUTO: 4.82 M/UL (ref 4.2–5.4)
SODIUM SERPL-SCNC: 131 MMOL/L (ref 135–145)
SODIUM SERPL-SCNC: 138 MMOL/L (ref 135–145)
T PALLIDUM AB SER QL IA: NORMAL
WBC # BLD AUTO: 15.2 K/UL (ref 4.8–10.8)
WBC # BLD AUTO: 7.9 K/UL (ref 4.8–10.8)

## 2024-12-10 PROCEDURE — 36415 COLL VENOUS BLD VENIPUNCTURE: CPT

## 2024-12-10 PROCEDURE — 700111 HCHG RX REV CODE 636 W/ 250 OVERRIDE (IP): Mod: JZ | Performed by: OBSTETRICS & GYNECOLOGY

## 2024-12-10 PROCEDURE — 82570 ASSAY OF URINE CREATININE: CPT

## 2024-12-10 PROCEDURE — 700111 HCHG RX REV CODE 636 W/ 250 OVERRIDE (IP): Performed by: OBSTETRICS & GYNECOLOGY

## 2024-12-10 PROCEDURE — 10H07YZ INSERTION OF OTHER DEVICE INTO PRODUCTS OF CONCEPTION, VIA NATURAL OR ARTIFICIAL OPENING: ICD-10-PCS | Performed by: PHYSICIAN ASSISTANT

## 2024-12-10 PROCEDURE — 700101 HCHG RX REV CODE 250: Performed by: ANESTHESIOLOGY

## 2024-12-10 PROCEDURE — 700105 HCHG RX REV CODE 258: Performed by: ANESTHESIOLOGY

## 2024-12-10 PROCEDURE — A9270 NON-COVERED ITEM OR SERVICE: HCPCS | Performed by: MIDWIFE

## 2024-12-10 PROCEDURE — 770002 HCHG ROOM/CARE - OB PRIVATE (112)

## 2024-12-10 PROCEDURE — 303615 HCHG EPIDURAL/SPINAL ANESTHESIA FOR LABOR

## 2024-12-10 PROCEDURE — 700111 HCHG RX REV CODE 636 W/ 250 OVERRIDE (IP): Performed by: MIDWIFE

## 2024-12-10 PROCEDURE — 85027 COMPLETE CBC AUTOMATED: CPT

## 2024-12-10 PROCEDURE — 700111 HCHG RX REV CODE 636 W/ 250 OVERRIDE (IP): Performed by: ANESTHESIOLOGY

## 2024-12-10 PROCEDURE — 700105 HCHG RX REV CODE 258: Performed by: OBSTETRICS & GYNECOLOGY

## 2024-12-10 PROCEDURE — 700102 HCHG RX REV CODE 250 W/ 637 OVERRIDE(OP): Performed by: MIDWIFE

## 2024-12-10 PROCEDURE — 83735 ASSAY OF MAGNESIUM: CPT

## 2024-12-10 PROCEDURE — 85025 COMPLETE CBC W/AUTO DIFF WBC: CPT

## 2024-12-10 PROCEDURE — 700105 HCHG RX REV CODE 258: Performed by: MIDWIFE

## 2024-12-10 PROCEDURE — 80053 COMPREHEN METABOLIC PANEL: CPT

## 2024-12-10 PROCEDURE — 84156 ASSAY OF PROTEIN URINE: CPT

## 2024-12-10 PROCEDURE — 700111 HCHG RX REV CODE 636 W/ 250 OVERRIDE (IP): Mod: JZ | Performed by: ANESTHESIOLOGY

## 2024-12-10 RX ORDER — LIDOCAINE HYDROCHLORIDE AND EPINEPHRINE 15; 5 MG/ML; UG/ML
INJECTION, SOLUTION EPIDURAL
Status: COMPLETED | OUTPATIENT
Start: 2024-12-10 | End: 2024-12-10

## 2024-12-10 RX ORDER — CALCIUM GLUCONATE 94 MG/ML
1 INJECTION, SOLUTION INTRAVENOUS
Status: DISCONTINUED | OUTPATIENT
Start: 2024-12-10 | End: 2024-12-11 | Stop reason: HOSPADM

## 2024-12-10 RX ORDER — ROPIVACAINE HYDROCHLORIDE 2 MG/ML
INJECTION, SOLUTION EPIDURAL; INFILTRATION; PERINEURAL CONTINUOUS
Status: DISCONTINUED | OUTPATIENT
Start: 2024-12-10 | End: 2024-12-11

## 2024-12-10 RX ORDER — EPHEDRINE SULFATE 50 MG/ML
5 INJECTION, SOLUTION INTRAVENOUS
Status: DISCONTINUED | OUTPATIENT
Start: 2024-12-10 | End: 2024-12-11

## 2024-12-10 RX ORDER — SODIUM CHLORIDE, SODIUM LACTATE, POTASSIUM CHLORIDE, AND CALCIUM CHLORIDE .6; .31; .03; .02 G/100ML; G/100ML; G/100ML; G/100ML
250 INJECTION, SOLUTION INTRAVENOUS PRN
Status: DISCONTINUED | OUTPATIENT
Start: 2024-12-10 | End: 2024-12-11

## 2024-12-10 RX ORDER — SODIUM CHLORIDE, SODIUM LACTATE, POTASSIUM CHLORIDE, AND CALCIUM CHLORIDE .6; .31; .03; .02 G/100ML; G/100ML; G/100ML; G/100ML
1000 INJECTION, SOLUTION INTRAVENOUS
Status: COMPLETED | OUTPATIENT
Start: 2024-12-10 | End: 2024-12-10

## 2024-12-10 RX ORDER — NIFEDIPINE 10 MG/1
10 CAPSULE ORAL
Status: DISCONTINUED | OUTPATIENT
Start: 2024-12-10 | End: 2024-12-11 | Stop reason: HOSPADM

## 2024-12-10 RX ORDER — LABETALOL HYDROCHLORIDE 5 MG/ML
20-80 INJECTION, SOLUTION INTRAVENOUS
Status: DISCONTINUED | OUTPATIENT
Start: 2024-12-10 | End: 2024-12-11 | Stop reason: HOSPADM

## 2024-12-10 RX ORDER — MAGNESIUM SULFATE HEPTAHYDRATE 40 MG/ML
2 INJECTION, SOLUTION INTRAVENOUS CONTINUOUS
Status: DISCONTINUED | OUTPATIENT
Start: 2024-12-10 | End: 2024-12-13 | Stop reason: HOSPADM

## 2024-12-10 RX ORDER — ROPIVACAINE HYDROCHLORIDE 2 MG/ML
INJECTION, SOLUTION EPIDURAL; INFILTRATION
Status: COMPLETED | OUTPATIENT
Start: 2024-12-10 | End: 2024-12-10

## 2024-12-10 RX ORDER — MAGNESIUM SULFATE HEPTAHYDRATE 40 MG/ML
4 INJECTION, SOLUTION INTRAVENOUS ONCE
Status: COMPLETED | OUTPATIENT
Start: 2024-12-10 | End: 2024-12-10

## 2024-12-10 RX ORDER — HYDRALAZINE HYDROCHLORIDE 20 MG/ML
5-10 INJECTION INTRAMUSCULAR; INTRAVENOUS
Status: DISCONTINUED | OUTPATIENT
Start: 2024-12-10 | End: 2024-12-11 | Stop reason: HOSPADM

## 2024-12-10 RX ORDER — SODIUM CHLORIDE, SODIUM LACTATE, POTASSIUM CHLORIDE, CALCIUM CHLORIDE 600; 310; 30; 20 MG/100ML; MG/100ML; MG/100ML; MG/100ML
INJECTION, SOLUTION INTRAVENOUS CONTINUOUS
Status: DISCONTINUED | OUTPATIENT
Start: 2024-12-10 | End: 2024-12-13 | Stop reason: HOSPADM

## 2024-12-10 RX ADMIN — AMPICILLIN SODIUM 1 G: 1 INJECTION, POWDER, FOR SOLUTION INTRAMUSCULAR; INTRAVENOUS at 16:07

## 2024-12-10 RX ADMIN — MAGNESIUM SULFATE HEPTAHYDRATE 4 G: 4 INJECTION, SOLUTION INTRAVENOUS at 18:51

## 2024-12-10 RX ADMIN — OXYTOCIN 1 MILLI-UNITS/MIN: 10 INJECTION, SOLUTION INTRAMUSCULAR; INTRAVENOUS at 10:22

## 2024-12-10 RX ADMIN — AMPICILLIN SODIUM 2 G: 2 INJECTION, POWDER, FOR SOLUTION INTRAVENOUS at 07:40

## 2024-12-10 RX ADMIN — ROPIVACAINE HYDROCHLORIDE 15 ML: 2 INJECTION EPIDURAL; INFILTRATION; PERINEURAL at 16:41

## 2024-12-10 RX ADMIN — SODIUM CHLORIDE, POTASSIUM CHLORIDE, SODIUM LACTATE AND CALCIUM CHLORIDE: 600; 310; 30; 20 INJECTION, SOLUTION INTRAVENOUS at 07:39

## 2024-12-10 RX ADMIN — LIDOCAINE HYDROCHLORIDE,EPINEPHRINE BITARTRATE 5 ML: 15; .005 INJECTION, SOLUTION EPIDURAL; INFILTRATION; INTRACAUDAL; PERINEURAL at 17:00

## 2024-12-10 RX ADMIN — AMPICILLIN SODIUM 1 G: 1 INJECTION, POWDER, FOR SOLUTION INTRAMUSCULAR; INTRAVENOUS at 20:16

## 2024-12-10 RX ADMIN — LABETALOL HYDROCHLORIDE 20 MG: 5 INJECTION, SOLUTION INTRAVENOUS at 18:09

## 2024-12-10 RX ADMIN — ROPIVACAINE HYDROCHLORIDE 12 ML: 2 INJECTION EPIDURAL; INFILTRATION; PERINEURAL at 19:49

## 2024-12-10 RX ADMIN — SODIUM CHLORIDE, SODIUM GLUCONATE, SODIUM ACETATE, POTASSIUM CHLORIDE AND MAGNESIUM CHLORIDE: 526; 502; 368; 37; 30 INJECTION, SOLUTION INTRAVENOUS at 16:41

## 2024-12-10 RX ADMIN — MAGNESIUM SULFATE HEPTAHYDRATE 2 G/HR: 40 INJECTION, SOLUTION INTRAVENOUS at 19:14

## 2024-12-10 RX ADMIN — FENTANYL CITRATE 100 MCG: 50 INJECTION, SOLUTION INTRAMUSCULAR; INTRAVENOUS at 13:48

## 2024-12-10 RX ADMIN — ONDANSETRON 4 MG: 2 INJECTION INTRAMUSCULAR; INTRAVENOUS at 17:57

## 2024-12-10 RX ADMIN — ROPIVACAINE HYDROCHLORIDE: 2 INJECTION, SOLUTION EPIDURAL; INFILTRATION at 23:02

## 2024-12-10 RX ADMIN — ROPIVACAINE HYDROCHLORIDE: 2 INJECTION, SOLUTION EPIDURAL; INFILTRATION at 17:08

## 2024-12-10 RX ADMIN — MISOPROSTOL 25 MCG: 100 TABLET ORAL at 00:13

## 2024-12-10 RX ADMIN — ONDANSETRON 4 MG: 2 INJECTION INTRAMUSCULAR; INTRAVENOUS at 07:54

## 2024-12-10 RX ADMIN — AMPICILLIN SODIUM 1 G: 1 INJECTION, POWDER, FOR SOLUTION INTRAMUSCULAR; INTRAVENOUS at 12:12

## 2024-12-10 RX ADMIN — FENTANYL CITRATE 100 MCG: 50 INJECTION, SOLUTION INTRAMUSCULAR; INTRAVENOUS at 14:53

## 2024-12-10 RX ADMIN — FENTANYL CITRATE 100 MCG: 50 INJECTION, SOLUTION INTRAMUSCULAR; INTRAVENOUS at 16:04

## 2024-12-10 RX ADMIN — SODIUM CHLORIDE, POTASSIUM CHLORIDE, SODIUM LACTATE AND CALCIUM CHLORIDE 1000 ML: 600; 310; 30; 20 INJECTION, SOLUTION INTRAVENOUS at 16:40

## 2024-12-10 RX ADMIN — ROPIVACAINE HYDROCHLORIDE: 2 INJECTION, SOLUTION EPIDURAL; INFILTRATION at 19:45

## 2024-12-10 ASSESSMENT — PAIN DESCRIPTION - PAIN TYPE
TYPE: ACUTE PAIN

## 2024-12-10 NOTE — PROGRESS NOTES
0100: Report received from EMMY Gutierrez. Care assumed, pt resting comfortably in bed, denies needs at this time. POC reviewed    0555: SVE by this RN 2/90/-3, amniotic fluid noted on pad, pt reports feeling wet ~15 min ago. SROM Clear fluid, CNM updated.     0700: Report given to EMMY Chaidez. POC discussed, care relinquished

## 2024-12-10 NOTE — PROGRESS NOTES
0700: Report received from EMMY Conway.   0717: SVE performed at pt request, 3/90/-3. Update given to Stephanie SCHWAB, orders received for pitocin.  0744: Pt declines pitocin infusion at this time for augmentation, would like more time to see he will progress on his own. Stephanie SCHWAB updated.  1015: SVE performed, unchanged from prior exam 3/90/-3. Discussed starting pitocin with pt, pt agreeable at this time.  Dr. Nunez updated on pt status.   1253: Dr. Nunez at bedside, SVE performed. 4/90/-2.   1557: Dr. Nunez at bedside. SVE performed, 6/90/0. Forebag noted and AROM performed by MD, clear fluid noted.   1645: Dr. Weir at bedside for epidural placement,  1648: Time out performed prior to epidural, all agree to proceed.   1657: Test dose given, no reaction.   1750: Pt reporting severe pain with contractions and between, limited relief from epidural bolus button and position changes. SVE performed, 6-7/90/0.   1755: Dr. Nunez updated on BP and labor status. Orders received for hypertensive protocol.   1812: Dr. Nunez updated on BP, MD to place orders for magnesium sulfate infusion.   1816: Dr. Weir at bedside for epidural bolus.  1900: Bedside report given to EMMY Bueno. Care relinquished.

## 2024-12-10 NOTE — H&P
Admission History and Physical      Bora Rodrigez is a 29 y.o. female  at 39w6d who presents for IOL for Obesity. Pregnancy has been complicated by rh negative, excessive fetal growth with EFW and AC >98%, obesity. Elevated BP at 1st and last prenatal appointment. Asymptomatic. Pre-e work up negative on .      Bora is transgender. Pronouns are he'him. He prefers father, dad. He is okay with using medical terminology for anatomy.      Subjective:   negative  For CTXS.   negative Feels pain   negative for LOF  negative for vaginal bleeding.   positive for fetal movement    He desires cytotec for cervical ripening. Would like to avoid an epidural if possible but is open to one if necessary.     ROS: Pertinent items are noted in HPI.      Past Medical History:   Diagnosis Date    Allergy     Depression     Gynecological disorder 10/16/2023    the cyst I am having removed was discovered    Head ache     Migraine     PONV (postoperative nausea and vomiting) 2024    This is the only time I have experienced this, and have been anesthetized 3 times total. Likely because this was the longest and most complex procedure I've had (double mastectomy with nipple grafts)    Pregnant 2024     Past Surgical History:   Procedure Laterality Date    CT LAP,DIAGNOSTIC ABDOMEN Left 2024    Procedure: LAPAROSCOPIC OVARIAN CYSTECTOMY, SALPINGO OOPHORECTOMY;  Surgeon: Kassie Jensen D.O.;  Location: SURGERY SAME DAY [facility];  Service: Gynecology    CT LAP,FULGURATE/EXCISE LESIONS Left 2024    Procedure: EXCISION, CYST, OVARY;  Surgeon: Kassie Jensen D.O.;  Location: SURGERY SAME DAY [facility];  Service: Gynecology    DILATION AND CURETTAGE  11/10/2023    MASTECTOMY BILATERAL SUBQ       OB History    Para Term  AB Living   2 0 0 0 1 0   SAB IAB Ectopic Molar Multiple Live Births   1 0 0 0 0 0      # Outcome Date GA Lbr Willi/2nd Weight Sex Type Anes PTL Lv   2 Current            1  SAB 11/2023              Birth Comments: D&C at [place]     Social History     Socioeconomic History    Marital status: Single     Spouse name: Not on file    Number of children: Not on file    Years of education: Not on file    Highest education level: Not on file   Occupational History    Not on file   Tobacco Use    Smoking status: Never    Smokeless tobacco: Never   Vaping Use    Vaping status: Never Used   Substance and Sexual Activity    Alcohol use: Not Currently     Comment: averaged 1 drink/week, quit when trying to concieve    Drug use: Not Currently     Types: Oral     Comment: have occasionally used thc in the past (edibles)    Sexual activity: Yes     Partners: Male     Comment: pt is transgender male, sexually active with transgender female   Other Topics Concern    Not on file   Social History Narrative    Not on file     Social Drivers of Health     Financial Resource Strain: Not on file   Food Insecurity: Not on file   Transportation Needs: Not on file   Physical Activity: Not on file   Stress: Not on file   Social Connections: Not on file   Intimate Partner Violence: Not on file   Housing Stability: Not on file     Allergies: Cottonseed oil  No current facility-administered medications for this encounter.    P[facility]atal care with [facility] starting at 9.2 wks with following problems:  Patient Active Problem List    Diagnosis Date Noted    Excessive fetal growth affecting management of mother in third trimester, antepartum 11/26/2024    Rh negative state in antepartum period 11/26/2024    Transgender 11/26/2024    Obesity in pregnancy 11/26/2024    PONV (postoperative nausea and vomiting) 06/05/2024    Prenatal care, subsequent pregnancy 05/09/2024    Left ovarian cyst 05/09/2024       Last US 11/3/24    FINDINGS:  Fetal Lie:  Vertex  LMP:  03/05/2024  Clinical NILS by LMP:  12/10/2024     Placenta (Location):  Posterior  Placenta Previa: No  Placental stGstrstastdstest:st st1st Amniotic Fluid Volume:  ROMMEL = 13.30 cm    "  Fetal Heart Rate:  136 bpm     Cervical Length:  Not seen     No maternal adnexal mass is identified.     Fetal Biometry  BPD    9.12 cm, 37 weeks, 0 days, (96 Percent)  HC    33.91 cm, 39 weeks, 0 days, (97 Percent)  AC    34.59 cm, 38 weeks, 4 days, (>98%)  Femur Length    6.84 cm, 35 weeks, 1 days, (53 Percent)  Humerus Length    6.29 cm, 36 weeks, 4 days, (92 Percent)        EGA by this US:  37 weeks, 3 days  NILS by this US: 11/21/2024        Estimated Fetal Weight:  3255 grams  EFW Percentile: >98%    Objective:      BP (!) 142/90   Pulse 87   Temp 36.3 °C (97.4 °F) (Oral)   Resp 18   Ht 1.676 m (5' 6\")   Wt 118 kg (260 lb)   LMP 03/05/2024   SpO2 96%   BMI 41.97 kg/m²     General:   no acute distress, alert, cooperative   Skin:   normal   HEENT:  PERRLA   Lungs:   CTA bilateral   Heart:   regular rate and rhythm, chest is clear without rales or wheezing, no pedal edema   Abdomen:   gravid, NT   EFW:  3800   Pelvis:  adequate   FHT:  140 BPM, moderate variability, + accels, no decels   Uterine Size: S=D   Presentations: Cephalic by BSUS   Cervix:     Dilation: 1cm    Effacement: Long    Station:  -3    Consistency: Medium    Position: Posterior     Lab Review  Lab:   Blood type: O     Recent Results (from the past 35 weeks)   HCG QUANTITATIVE    Collection Time: 04/15/24 11:29 AM   Result Value Ref Range    cg 37094.0 (H) 0.0 - 5.0 mIU/mL   THINPREP RFLX HPV ASCUS W/CTNG    Collection Time: 05/09/24 12:01 PM   Result Value Ref Range    DIAGNOSIS: SEE BELOW     Specimen adequacy: SEE BELOW     Performed by: SEE BELOW     Comment Comment     Note: SEE BELOW     Test Methodology: SEE BELOW     Chlamydia, Nuc. Acid Amp Negative Negative    Gonococcus, Nuc. Acid Amp Negative Negative    Comment SEE BELOW    PREG CNTR PRENATAL PN    Collection Time: 05/13/24 11:32 AM   Result Value Ref Range    WBC 6.1 4.8 - 10.8 K/uL    RBC 4.59 4.20 - 5.40 M/uL    Hemoglobin 13.3 12.0 - 16.0 g/dL    Hematocrit 38.0 37.0 " - 47.0 %    MCV 82.8 81.4 - 97.8 fL    MCH 29.0 27.0 - 33.0 pg    MCHC 35.0 32.2 - 35.5 g/dL    RDW 38.0 35.9 - 50.0 fL    Platelet Count 213 164 - 446 K/uL    MPV 10.9 9.0 - 12.9 fL    Hepatitis C Antibody Non-Reactive Non-Reactive    Hepatitis B Surface Antigen Non-Reactive Non-Reactive    Rubella IgG Antibody 296.00 IU/mL    Syphilis, Treponemal Qual Non-Reactive Non-Reactive   URINE CULTURE(NEW)    Collection Time: 05/13/24 11:32 AM    Specimen: Urine   Result Value Ref Range    Significant Indicator NEG     Source UR     Site Clean Catch     Culture Result Usual urogenital soha ,000 cfu/mL    HIV AG/AB COMBO ASSAY SCREENING    Collection Time: 05/13/24 11:32 AM   Result Value Ref Range    HIV Ag/Ab Combo Assay Non-Reactive Non Reactive   OP Prenatal Panel-Blood Bank    Collection Time: 05/13/24 11:32 AM   Result Value Ref Range    ABO Grouping Only O     Rh Grouping Only NEG     Antibody Screen Scrn NEG    URINE DRUG SCREEN W/CONF (AR)    Collection Time: 05/13/24 11:33 AM   Result Value Ref Range    Urine Amphetamine-Methamphetam Negative Cutoff 300 ng/mL    Barbiturates Negative Cutoff 200 ng/mL    Benzodiazepines Negative Cutoff 200 ng/mL    Propoxyphene Negative Cutoff 300 ng/mL    Cocaine Metabolite Negative Cutoff 150 ng/mL    Methadone Negative Cutoff 150 ng/mL    Codeine-Morphine Negative Cutoff 300 ng/mL    Phencyclidine -Pcp Negative Cutoff 25 ng/mL    Cannabinoid Metab Negative Cutoff 50 ng/mL    Creatinine Urine 20.4 20.0 - 400.0 mg/dL    Drug Comment Urine Drugs See Note    Basic Metabolic Panel    Collection Time: 05/29/24  7:37 AM   Result Value Ref Range    Sodium 138 135 - 145 mmol/L    Potassium 3.6 3.6 - 5.5 mmol/L    Chloride 104 96 - 112 mmol/L    Co2 20 20 - 33 mmol/L    Glucose 83 65 - 99 mg/dL    Bun 8 8 - 22 mg/dL    Creatinine 0.52 0.50 - 1.40 mg/dL    Calcium 9.0 8.5 - 10.5 mg/dL    Anion Gap 14.0 7.0 - 16.0   FASTING STATUS    Collection Time: 05/29/24  7:37 AM   Result Value  Ref Range    Fasting Status Non-Fasting    ESTIMATED GFR    Collection Time: 05/29/24  7:37 AM   Result Value Ref Range    GFR (CKD-EPI) 128 >60 mL/min/1.73 m 2   Histology Request    Collection Time: 06/05/24  1:25 PM   Result Value Ref Range    Pathology Request Sent to [facility] PRENATAL TEST    Collection Time: 06/10/24  9:38 AM   Result Value Ref Range    REPORT SUMMARY LOW RISK     REPORT NOTE See Notes     GENDER OF FETUS Male     FETAL FRACTION 9.1%     TRISOMY 21 RESULT TEXT Low Risk     TRISOMY 21 AGE-BASED RISK TEXT 1/626 (0.16%)     TRISOMY 21 RISK SCORE TEXT <1/10,000 (<0.01%)     TRISOMY 18 RESULT TEXT Low Risk     TRISOMY 18 AGE-BASED RISK TEXT 1/1,456 (0.07%)     TRISOMY 18 RISK SCORE TEXT <1/10,000 (<0.01%)     TRISOMY 13 RESULT TEXT Low Risk     TRISOMY 13 AGE-BASED RISK TEXT 1/4,585 (0.02%)     TRISOMY 13 RISK SCORE TEXT <1/10,000 (<0.01%)     MONOSOMY X RESULT TEXT Low Risk     MONOSOMY X AGE-BASED RISK TEXT 1/255 (0.39%)     MONOSOMY X RISK SCORE TEXT <1/10,000 (<0.01%)     TRIPLOIDY RESULT TEXT Low Risk     22Q11.2 DELETION SYNDROME RESULT TEXT Low Risk     22Q11.2 DELETION SYNDROME POPULATION-BASED RISK TEXT 1/2,000     22Q11.2 DELETION SYNDROME RISK SCORE TEXT 1/12,000     1P36 DELETION SYNDROME RESULT TEXT Low Risk     1P36 DELETION SYNDROME POPULATION-BASED RISK TEXT 1/5,000     1P36 DELETION SYNDROME RISK SCORE TEXT 1/12,400     ANGELMAN SYNDROME RESULT TEXT Low Risk     ANGELMAN SYNDROME POPULATION-BASED RISK TEXT 1/12,000     ANGELMAN SYNDROME RISK SCORE TEXT 1/16,600     CRI-DU-CHAT SYNDROME RESULT TEXT Low Risk     CRI-DU-CHAT SYNDROME POPULATION-BASED RISK TEXT 1/20,000     CRI-DU-CHAT SYNDROME RISK SCORE TEXT 1/57,100     PRADER-WILLI SYNDROME RESULT TEXT Low Risk     PRADER-WILLI SYNDROME POPULATION-BASED RISK TEXT 1/10,000     PRADER-WILLI SYNDROME RISK SCORE TEXT 1/13,800     FOOTNOTES See Notes    T.PALLIDUM AB KATELYN (SCREENING)    Collection Time: 09/24/24  8:44 AM    Result Value Ref Range    Syphilis, Treponemal Qual Nonreactive Non-Reactive   GLUCOSE 1HR GESTATIONAL    Collection Time: 09/24/24  8:44 AM   Result Value Ref Range    Glucose, Post Dose 104 70 - 139 mg/dL   CBC WITHOUT DIFFERENTIAL    Collection Time: 09/24/24  8:44 AM   Result Value Ref Range    WBC 4.2 (L) 4.8 - 10.8 K/uL    RBC 3.87 (L) 4.20 - 5.40 M/uL    Hemoglobin 11.1 (L) 12.0 - 16.0 g/dL    Hematocrit 33.3 (L) 37.0 - 47.0 %    MCV 86.0 81.4 - 97.8 fL    MCH 28.7 27.0 - 33.0 pg    MCHC 33.3 32.2 - 35.5 g/dL    RDW 44.9 35.9 - 50.0 fL    Platelet Count 159 (L) 164 - 446 K/uL    MPV 11.0 9.0 - 12.9 fL   GRP B STREP, BY PCR (RIVERA BROTH)    Collection Time: 11/11/24 10:35 AM    Specimen: Genital   Result Value Ref Range    Strep Gp B DNA PCR POSITIVE (A) Negative   URIC ACID    Collection Time: 12/02/24  1:27 PM   Result Value Ref Range    Uric Acid 5.5 1.9 - 8.2 mg/dL   PROTEIN/CREAT RATIO URINE    Collection Time: 12/02/24  1:27 PM   Result Value Ref Range    Total Protein, Urine <6.0 0.0 - 15.0 mg/dL    Creatinine, Random Urine 37.20 mg/dL    Protein Creatinine Ratio 161 (H) 10 - 107 mg/g   Comp Metabolic Panel    Collection Time: 12/02/24  1:27 PM   Result Value Ref Range    Sodium 135 135 - 145 mmol/L    Potassium 4.1 3.6 - 5.5 mmol/L    Chloride 102 96 - 112 mmol/L    Co2 22 20 - 33 mmol/L    Anion Gap 11.0 7.0 - 16.0    Glucose 124 (H) 65 - 99 mg/dL    Bun 8 8 - 22 mg/dL    Creatinine 0.82 0.50 - 1.40 mg/dL    Calcium 9.5 8.5 - 10.5 mg/dL    Correct Calcium 9.7 8.5 - 10.5 mg/dL    AST(SGOT) 18 12 - 45 U/L    ALT(SGPT) 15 2 - 50 U/L    Alkaline Phosphatase 106 (H) 30 - 99 U/L    Total Bilirubin 0.3 0.1 - 1.5 mg/dL    Albumin 3.7 3.2 - 4.9 g/dL    Total Protein 6.9 6.0 - 8.2 g/dL    Globulin 3.2 1.9 - 3.5 g/dL    A-G Ratio 1.2 g/dL   CBC WITHOUT DIFFERENTIAL    Collection Time: 12/02/24  1:27 PM   Result Value Ref Range    WBC 7.1 4.8 - 10.8 K/uL    RBC 4.68 4.20 - 5.40 M/uL    Hemoglobin 12.7 12.0 - 16.0  g/dL    Hematocrit 39.3 37.0 - 47.0 %    MCV 84.0 81.4 - 97.8 fL    MCH 27.1 27.0 - 33.0 pg    MCHC 32.3 32.2 - 35.5 g/dL    RDW 45.1 35.9 - 50.0 fL    Platelet Count 167 164 - 446 K/uL    MPV 12.3 9.0 - 12.9 fL   ESTIMATED GFR    Collection Time: 24  1:27 PM   Result Value Ref Range    GFR (CKD-EPI) 98 >60 mL/min/1.73 m 2            Assessment:   Bora Rodrigez at 39w6d  Labor status: Not in labor.  Obstetrical history significant for   Patient Active Problem List    Diagnosis Date Noted    Excessive fetal growth affecting management of mother in third trimester, antepartum 2024    Rh negative state in antepartum period 2024    Transgender 2024    Obesity in pregnancy 2024    PONV (postoperative nausea and vomiting) 2024    Prenatal care, subsequent pregnancy 2024    Left ovarian cyst 2024   .      Plan:     1. Admit to L&D for Induction of Labor for obesity  2. Mild hypertension upon admission. Pre-e labs sent. Continue to monitor and treat prn.  3. Excessive fetal growth and  Obesity: Discuss increased risk of shoulder dystocia with EFW and AC >98% in a primipara. We have discussed that although an EFW of >5000g in non diabetic patients is the standard recommendation for  delivery, I would recommend against this and suggest that EFW >4500g is a reasonable consideration for  delivery. EFW of his baby is 3800g today by Leopold's. He is also aware that assessment will be completed while he is in labor. If he has a prolonged second stage or dysfunctional labor pattern,  section may be recommended. Discuss the what a shoulder dystocia means, the techniques available to dislodge the shoulder, and the potential complications including long term nerve damage, fractures of clavicle and humerus,  distress requiring NICU admission, postpartum hemorrhage, and in severe cases fetal/ death. Patient understands his risks and would like  to labor and attempt vaginal delivery.   4. GBS positive. Prophylactic antibiotic ordered.   5. Desires  Comfort measures for pain relief.   6. Plan at this time is Cytotec q4 hours for up to 6 doses or less if Rock score indicates safety for Pitocin.   7. Anticipate        Stephanie Bah CNM/ Dr. Jay Attending

## 2024-12-10 NOTE — PROGRESS NOTES
29 y.o  EDC 12/10/24 (39w6d)    Pt arrives to Labor and Delivery for scheduled IOL d/t HTN. Pt states +FM, denies LOF/VB/Ctx. VSS. Stephanie SCHWAB at bedside. SVE -/-3 vertex. POC discussed. All questions and concerns addressed at this time.    0013 Cytotec placed (See MAR)    0100 Report given to Man BOOTH.

## 2024-12-11 ENCOUNTER — ANESTHESIA (OUTPATIENT)
Dept: ANESTHESIOLOGY | Facility: MEDICAL CENTER | Age: 29
End: 2024-12-11
Payer: COMMERCIAL

## 2024-12-11 ENCOUNTER — ANESTHESIA EVENT (OUTPATIENT)
Dept: ANESTHESIOLOGY | Facility: MEDICAL CENTER | Age: 29
End: 2024-12-11
Payer: COMMERCIAL

## 2024-12-11 LAB
IMMUNE ROSETTING TEST 8505FMH: NORMAL
MAGNESIUM SERPL-MCNC: 5.1 MG/DL (ref 1.5–2.5)
MAGNESIUM SERPL-MCNC: 5.5 MG/DL (ref 1.5–2.5)
MAGNESIUM SERPL-MCNC: 6.2 MG/DL (ref 1.5–2.5)
NUMBER OF RH DOSES IND 8505RD: 1

## 2024-12-11 PROCEDURE — 700102 HCHG RX REV CODE 250 W/ 637 OVERRIDE(OP): Performed by: OBSTETRICS & GYNECOLOGY

## 2024-12-11 PROCEDURE — 85461 HEMOGLOBIN FETAL: CPT

## 2024-12-11 PROCEDURE — 700105 HCHG RX REV CODE 258: Performed by: OBSTETRICS & GYNECOLOGY

## 2024-12-11 PROCEDURE — 160035 HCHG PACU - 1ST 60 MINS PHASE I: Performed by: OBSTETRICS & GYNECOLOGY

## 2024-12-11 PROCEDURE — 700111 HCHG RX REV CODE 636 W/ 250 OVERRIDE (IP): Mod: JZ | Performed by: ANESTHESIOLOGY

## 2024-12-11 PROCEDURE — 770002 HCHG ROOM/CARE - OB PRIVATE (112)

## 2024-12-11 PROCEDURE — 700111 HCHG RX REV CODE 636 W/ 250 OVERRIDE (IP): Performed by: OBSTETRICS & GYNECOLOGY

## 2024-12-11 PROCEDURE — 160002 HCHG RECOVERY MINUTES (STAT): Performed by: OBSTETRICS & GYNECOLOGY

## 2024-12-11 PROCEDURE — 700102 HCHG RX REV CODE 250 W/ 637 OVERRIDE(OP): Performed by: ANESTHESIOLOGY

## 2024-12-11 PROCEDURE — 83735 ASSAY OF MAGNESIUM: CPT

## 2024-12-11 PROCEDURE — 700101 HCHG RX REV CODE 250: Performed by: OBSTETRICS & GYNECOLOGY

## 2024-12-11 PROCEDURE — 160009 HCHG ANES TIME/MIN: Performed by: OBSTETRICS & GYNECOLOGY

## 2024-12-11 PROCEDURE — C1765 ADHESION BARRIER: HCPCS | Performed by: OBSTETRICS & GYNECOLOGY

## 2024-12-11 PROCEDURE — 700102 HCHG RX REV CODE 250 W/ 637 OVERRIDE(OP)

## 2024-12-11 PROCEDURE — A9270 NON-COVERED ITEM OR SERVICE: HCPCS

## 2024-12-11 PROCEDURE — 700111 HCHG RX REV CODE 636 W/ 250 OVERRIDE (IP): Performed by: FAMILY MEDICINE

## 2024-12-11 PROCEDURE — 36415 COLL VENOUS BLD VENIPUNCTURE: CPT

## 2024-12-11 PROCEDURE — 160048 HCHG OR STATISTICAL LEVEL 1-5: Performed by: OBSTETRICS & GYNECOLOGY

## 2024-12-11 PROCEDURE — A9270 NON-COVERED ITEM OR SERVICE: HCPCS | Performed by: ANESTHESIOLOGY

## 2024-12-11 PROCEDURE — 700101 HCHG RX REV CODE 250: Performed by: ANESTHESIOLOGY

## 2024-12-11 PROCEDURE — 160041 HCHG SURGERY MINUTES - EA ADDL 1 MIN LEVEL 4: Performed by: OBSTETRICS & GYNECOLOGY

## 2024-12-11 PROCEDURE — A9270 NON-COVERED ITEM OR SERVICE: HCPCS | Performed by: OBSTETRICS & GYNECOLOGY

## 2024-12-11 PROCEDURE — 59510 CESAREAN DELIVERY: CPT | Performed by: OBSTETRICS & GYNECOLOGY

## 2024-12-11 PROCEDURE — 160029 HCHG SURGERY MINUTES - 1ST 30 MINS LEVEL 4: Performed by: OBSTETRICS & GYNECOLOGY

## 2024-12-11 PROCEDURE — 59514 CESAREAN DELIVERY ONLY: CPT | Mod: 80 | Performed by: STUDENT IN AN ORGANIZED HEALTH CARE EDUCATION/TRAINING PROGRAM

## 2024-12-11 PROCEDURE — 700101 HCHG RX REV CODE 250

## 2024-12-11 PROCEDURE — C1755 CATHETER, INTRASPINAL: HCPCS | Performed by: OBSTETRICS & GYNECOLOGY

## 2024-12-11 RX ORDER — CARBOPROST TROMETHAMINE 250 UG/ML
250 INJECTION, SOLUTION INTRAMUSCULAR ONCE
Status: COMPLETED | OUTPATIENT
Start: 2024-12-11 | End: 2024-12-11

## 2024-12-11 RX ORDER — ONDANSETRON 4 MG/1
4 TABLET, ORALLY DISINTEGRATING ORAL EVERY 6 HOURS PRN
Status: DISCONTINUED | OUTPATIENT
Start: 2024-12-12 | End: 2024-12-13 | Stop reason: HOSPADM

## 2024-12-11 RX ORDER — SODIUM CHLORIDE 9 MG/ML
INJECTION, SOLUTION INTRAVENOUS CONTINUOUS
Status: DISCONTINUED | OUTPATIENT
Start: 2024-12-11 | End: 2024-12-13 | Stop reason: HOSPADM

## 2024-12-11 RX ORDER — ALBUTEROL SULFATE 5 MG/ML
2.5 SOLUTION RESPIRATORY (INHALATION)
Status: DISCONTINUED | OUTPATIENT
Start: 2024-12-11 | End: 2024-12-11 | Stop reason: HOSPADM

## 2024-12-11 RX ORDER — OXYCODONE HCL 5 MG/5 ML
10 SOLUTION, ORAL ORAL
Status: COMPLETED | OUTPATIENT
Start: 2024-12-11 | End: 2024-12-11

## 2024-12-11 RX ORDER — HYDROMORPHONE HYDROCHLORIDE 1 MG/ML
0.1 INJECTION, SOLUTION INTRAMUSCULAR; INTRAVENOUS; SUBCUTANEOUS
Status: DISCONTINUED | OUTPATIENT
Start: 2024-12-11 | End: 2024-12-11 | Stop reason: HOSPADM

## 2024-12-11 RX ORDER — ACETAMINOPHEN 500 MG
1000 TABLET ORAL ONCE
Status: DISPENSED | OUTPATIENT
Start: 2024-12-11 | End: 2024-12-12

## 2024-12-11 RX ORDER — HYDROMORPHONE HYDROCHLORIDE 1 MG/ML
0.2 INJECTION, SOLUTION INTRAMUSCULAR; INTRAVENOUS; SUBCUTANEOUS
Status: DISCONTINUED | OUTPATIENT
Start: 2024-12-11 | End: 2024-12-11 | Stop reason: HOSPADM

## 2024-12-11 RX ORDER — SODIUM CHLORIDE, SODIUM LACTATE, POTASSIUM CHLORIDE, CALCIUM CHLORIDE 600; 310; 30; 20 MG/100ML; MG/100ML; MG/100ML; MG/100ML
2000 INJECTION, SOLUTION INTRAVENOUS PRN
Status: DISCONTINUED | OUTPATIENT
Start: 2024-12-11 | End: 2024-12-11

## 2024-12-11 RX ORDER — ONDANSETRON 2 MG/ML
INJECTION INTRAMUSCULAR; INTRAVENOUS PRN
Status: DISCONTINUED | OUTPATIENT
Start: 2024-12-11 | End: 2024-12-11 | Stop reason: SURG

## 2024-12-11 RX ORDER — EPHEDRINE SULFATE 50 MG/ML
10 INJECTION, SOLUTION INTRAVENOUS
Status: ACTIVE | OUTPATIENT
Start: 2024-12-11 | End: 2024-12-12

## 2024-12-11 RX ORDER — DIPHENOXYLATE HYDROCHLORIDE AND ATROPINE SULFATE 2.5; .025 MG/1; MG/1
1 TABLET ORAL ONCE
Status: COMPLETED | OUTPATIENT
Start: 2024-12-11 | End: 2024-12-11

## 2024-12-11 RX ORDER — OXYCODONE HYDROCHLORIDE 10 MG/1
10 TABLET ORAL EVERY 4 HOURS PRN
Status: DISCONTINUED | OUTPATIENT
Start: 2024-12-12 | End: 2024-12-13 | Stop reason: HOSPADM

## 2024-12-11 RX ORDER — SODIUM CHLORIDE, SODIUM LACTATE, POTASSIUM CHLORIDE, CALCIUM CHLORIDE 600; 310; 30; 20 MG/100ML; MG/100ML; MG/100ML; MG/100ML
INJECTION, SOLUTION INTRAVENOUS CONTINUOUS
Status: DISCONTINUED | OUTPATIENT
Start: 2024-12-11 | End: 2024-12-13 | Stop reason: HOSPADM

## 2024-12-11 RX ORDER — OXYCODONE HCL 5 MG/5 ML
5 SOLUTION, ORAL ORAL
Status: COMPLETED | OUTPATIENT
Start: 2024-12-11 | End: 2024-12-11

## 2024-12-11 RX ORDER — OXYTOCIN 10 [USP'U]/ML
10 INJECTION, SOLUTION INTRAMUSCULAR; INTRAVENOUS
Status: DISCONTINUED | OUTPATIENT
Start: 2024-12-11 | End: 2024-12-13 | Stop reason: HOSPADM

## 2024-12-11 RX ORDER — LIDOCAINE HCL/EPINEPHRINE/PF 2%-1:200K
VIAL (ML) INJECTION PRN
Status: DISCONTINUED | OUTPATIENT
Start: 2024-12-11 | End: 2024-12-11 | Stop reason: SURG

## 2024-12-11 RX ORDER — ACETAMINOPHEN 500 MG
1000 TABLET ORAL EVERY 6 HOURS
Status: COMPLETED | OUTPATIENT
Start: 2024-12-11 | End: 2024-12-12

## 2024-12-11 RX ORDER — HYDROMORPHONE HYDROCHLORIDE 1 MG/ML
0.2 INJECTION, SOLUTION INTRAMUSCULAR; INTRAVENOUS; SUBCUTANEOUS
Status: ACTIVE | OUTPATIENT
Start: 2024-12-11 | End: 2024-12-12

## 2024-12-11 RX ORDER — LABETALOL HYDROCHLORIDE 5 MG/ML
5 INJECTION, SOLUTION INTRAVENOUS
Status: DISCONTINUED | OUTPATIENT
Start: 2024-12-11 | End: 2024-12-11 | Stop reason: HOSPADM

## 2024-12-11 RX ORDER — DIPHENHYDRAMINE HYDROCHLORIDE 50 MG/ML
12.5 INJECTION INTRAMUSCULAR; INTRAVENOUS
Status: DISCONTINUED | OUTPATIENT
Start: 2024-12-11 | End: 2024-12-11 | Stop reason: HOSPADM

## 2024-12-11 RX ORDER — IBUPROFEN 800 MG/1
800 TABLET, FILM COATED ORAL EVERY 8 HOURS
Status: DISCONTINUED | OUTPATIENT
Start: 2024-12-12 | End: 2024-12-13 | Stop reason: HOSPADM

## 2024-12-11 RX ORDER — CEFAZOLIN SODIUM 1 G/3ML
INJECTION, POWDER, FOR SOLUTION INTRAMUSCULAR; INTRAVENOUS PRN
Status: DISCONTINUED | OUTPATIENT
Start: 2024-12-11 | End: 2024-12-11

## 2024-12-11 RX ORDER — AZITHROMYCIN 500 MG/5ML
500 INJECTION, POWDER, LYOPHILIZED, FOR SOLUTION INTRAVENOUS ONCE
Status: COMPLETED | OUTPATIENT
Start: 2024-12-11 | End: 2024-12-11

## 2024-12-11 RX ORDER — ACETAMINOPHEN 500 MG
1000 TABLET ORAL EVERY 6 HOURS
Status: DISCONTINUED | OUTPATIENT
Start: 2024-12-12 | End: 2024-12-13 | Stop reason: HOSPADM

## 2024-12-11 RX ORDER — CEFAZOLIN SODIUM 1 G/3ML
INJECTION, POWDER, FOR SOLUTION INTRAMUSCULAR; INTRAVENOUS PRN
Status: DISCONTINUED | OUTPATIENT
Start: 2024-12-11 | End: 2024-12-11 | Stop reason: SURG

## 2024-12-11 RX ORDER — DIPHENHYDRAMINE HCL 25 MG
25 TABLET ORAL EVERY 6 HOURS PRN
Status: DISCONTINUED | OUTPATIENT
Start: 2024-12-12 | End: 2024-12-13 | Stop reason: HOSPADM

## 2024-12-11 RX ORDER — MAGNESIUM SULFATE HEPTAHYDRATE 40 MG/ML
2 INJECTION, SOLUTION INTRAVENOUS CONTINUOUS
Status: DISCONTINUED | OUTPATIENT
Start: 2024-12-11 | End: 2024-12-13 | Stop reason: HOSPADM

## 2024-12-11 RX ORDER — HYDROMORPHONE HYDROCHLORIDE 1 MG/ML
0.4 INJECTION, SOLUTION INTRAMUSCULAR; INTRAVENOUS; SUBCUTANEOUS
Status: DISCONTINUED | OUTPATIENT
Start: 2024-12-11 | End: 2024-12-11 | Stop reason: HOSPADM

## 2024-12-11 RX ORDER — ENOXAPARIN SODIUM 100 MG/ML
60 INJECTION SUBCUTANEOUS DAILY
Status: DISCONTINUED | OUTPATIENT
Start: 2024-12-11 | End: 2024-12-13 | Stop reason: HOSPADM

## 2024-12-11 RX ORDER — MORPHINE SULFATE 0.5 MG/ML
INJECTION, SOLUTION EPIDURAL; INTRATHECAL; INTRAVENOUS PRN
Status: DISCONTINUED | OUTPATIENT
Start: 2024-12-11 | End: 2024-12-11 | Stop reason: SURG

## 2024-12-11 RX ORDER — MISOPROSTOL 200 UG/1
TABLET ORAL
Status: COMPLETED
Start: 2024-12-11 | End: 2024-12-11

## 2024-12-11 RX ORDER — PHENYLEPHRINE HYDROCHLORIDE 10 MG/ML
INJECTION, SOLUTION INTRAMUSCULAR; INTRAVENOUS; SUBCUTANEOUS PRN
Status: DISCONTINUED | OUTPATIENT
Start: 2024-12-11 | End: 2024-12-11 | Stop reason: SURG

## 2024-12-11 RX ORDER — SODIUM CHLORIDE 9 MG/ML
INJECTION, SOLUTION INTRAVENOUS ONCE
Status: DISCONTINUED | OUTPATIENT
Start: 2024-12-11 | End: 2024-12-13 | Stop reason: HOSPADM

## 2024-12-11 RX ORDER — ACETAMINOPHEN 500 MG
1000 TABLET ORAL EVERY 4 HOURS PRN
Status: DISCONTINUED | OUTPATIENT
Start: 2024-12-11 | End: 2024-12-11

## 2024-12-11 RX ORDER — CEFAZOLIN SODIUM 1 G/3ML
2 INJECTION, POWDER, FOR SOLUTION INTRAMUSCULAR; INTRAVENOUS ONCE
Status: DISCONTINUED | OUTPATIENT
Start: 2024-12-11 | End: 2024-12-11 | Stop reason: HOSPADM

## 2024-12-11 RX ORDER — SODIUM CHLORIDE, SODIUM GLUCONATE, SODIUM ACETATE, POTASSIUM CHLORIDE AND MAGNESIUM CHLORIDE 526; 502; 368; 37; 30 MG/100ML; MG/100ML; MG/100ML; MG/100ML; MG/100ML
INJECTION, SOLUTION INTRAVENOUS
Status: DISCONTINUED | OUTPATIENT
Start: 2024-12-10 | End: 2024-12-11 | Stop reason: SURG

## 2024-12-11 RX ORDER — OXYCODONE HYDROCHLORIDE 5 MG/1
5 TABLET ORAL EVERY 4 HOURS PRN
Status: DISCONTINUED | OUTPATIENT
Start: 2024-12-12 | End: 2024-12-13 | Stop reason: HOSPADM

## 2024-12-11 RX ORDER — DIPHENHYDRAMINE HYDROCHLORIDE 50 MG/ML
12.5 INJECTION INTRAMUSCULAR; INTRAVENOUS EVERY 6 HOURS PRN
Status: ACTIVE | OUTPATIENT
Start: 2024-12-11 | End: 2024-12-12

## 2024-12-11 RX ORDER — METOPROLOL TARTRATE 1 MG/ML
1 INJECTION, SOLUTION INTRAVENOUS
Status: DISCONTINUED | OUTPATIENT
Start: 2024-12-11 | End: 2024-12-11 | Stop reason: HOSPADM

## 2024-12-11 RX ORDER — ONDANSETRON 2 MG/ML
4 INJECTION INTRAMUSCULAR; INTRAVENOUS EVERY 6 HOURS PRN
Status: DISCONTINUED | OUTPATIENT
Start: 2024-12-12 | End: 2024-12-13 | Stop reason: HOSPADM

## 2024-12-11 RX ORDER — ONDANSETRON 2 MG/ML
4 INJECTION INTRAMUSCULAR; INTRAVENOUS
Status: DISCONTINUED | OUTPATIENT
Start: 2024-12-11 | End: 2024-12-11 | Stop reason: HOSPADM

## 2024-12-11 RX ORDER — KETOROLAC TROMETHAMINE 15 MG/ML
15 INJECTION, SOLUTION INTRAMUSCULAR; INTRAVENOUS EVERY 6 HOURS
Status: COMPLETED | OUTPATIENT
Start: 2024-12-11 | End: 2024-12-12

## 2024-12-11 RX ORDER — HYDROMORPHONE HYDROCHLORIDE 1 MG/ML
0.4 INJECTION, SOLUTION INTRAMUSCULAR; INTRAVENOUS; SUBCUTANEOUS
Status: ACTIVE | OUTPATIENT
Start: 2024-12-11 | End: 2024-12-12

## 2024-12-11 RX ORDER — MISOPROSTOL 200 UG/1
800 TABLET ORAL 4 TIMES DAILY
Status: DISCONTINUED | OUTPATIENT
Start: 2024-12-11 | End: 2024-12-11

## 2024-12-11 RX ORDER — CARBOPROST TROMETHAMINE 250 UG/ML
INJECTION, SOLUTION INTRAMUSCULAR
Status: COMPLETED
Start: 2024-12-11 | End: 2024-12-11

## 2024-12-11 RX ORDER — ACETAMINOPHEN 500 MG
1000 TABLET ORAL EVERY 6 HOURS PRN
Status: DISCONTINUED | OUTPATIENT
Start: 2024-12-15 | End: 2024-12-13 | Stop reason: HOSPADM

## 2024-12-11 RX ORDER — MEPERIDINE HYDROCHLORIDE 25 MG/ML
12.5 INJECTION INTRAMUSCULAR; INTRAVENOUS; SUBCUTANEOUS
Status: DISCONTINUED | OUTPATIENT
Start: 2024-12-11 | End: 2024-12-11 | Stop reason: HOSPADM

## 2024-12-11 RX ORDER — ENOXAPARIN SODIUM 100 MG/ML
40 INJECTION SUBCUTANEOUS DAILY
Status: DISCONTINUED | OUTPATIENT
Start: 2024-12-11 | End: 2024-12-11

## 2024-12-11 RX ORDER — MAGNESIUM SULFATE HEPTAHYDRATE 40 MG/ML
4 INJECTION, SOLUTION INTRAVENOUS ONCE
Status: ACTIVE | OUTPATIENT
Start: 2024-12-11 | End: 2024-12-12

## 2024-12-11 RX ORDER — DIPHENHYDRAMINE HYDROCHLORIDE 50 MG/ML
25 INJECTION INTRAMUSCULAR; INTRAVENOUS EVERY 6 HOURS PRN
Status: DISCONTINUED | OUTPATIENT
Start: 2024-12-12 | End: 2024-12-13 | Stop reason: HOSPADM

## 2024-12-11 RX ORDER — CITRIC ACID/SODIUM CITRATE 334-500MG
30 SOLUTION, ORAL ORAL ONCE
Status: COMPLETED | OUTPATIENT
Start: 2024-12-11 | End: 2024-12-11

## 2024-12-11 RX ORDER — CALCIUM GLUCONATE 94 MG/ML
1 INJECTION, SOLUTION INTRAVENOUS
Status: DISCONTINUED | OUTPATIENT
Start: 2024-12-11 | End: 2024-12-13 | Stop reason: HOSPADM

## 2024-12-11 RX ORDER — OXYCODONE HYDROCHLORIDE 10 MG/1
10 TABLET ORAL EVERY 4 HOURS PRN
Status: ACTIVE | OUTPATIENT
Start: 2024-12-11 | End: 2024-12-12

## 2024-12-11 RX ORDER — METOCLOPRAMIDE HYDROCHLORIDE 5 MG/ML
10 INJECTION INTRAMUSCULAR; INTRAVENOUS ONCE
Status: COMPLETED | OUTPATIENT
Start: 2024-12-11 | End: 2024-12-11

## 2024-12-11 RX ORDER — DEXAMETHASONE SODIUM PHOSPHATE 4 MG/ML
INJECTION, SOLUTION INTRA-ARTICULAR; INTRALESIONAL; INTRAMUSCULAR; INTRAVENOUS; SOFT TISSUE PRN
Status: DISCONTINUED | OUTPATIENT
Start: 2024-12-11 | End: 2024-12-11 | Stop reason: SURG

## 2024-12-11 RX ORDER — OXYCODONE HYDROCHLORIDE 5 MG/1
5 TABLET ORAL EVERY 4 HOURS PRN
Status: ACTIVE | OUTPATIENT
Start: 2024-12-11 | End: 2024-12-12

## 2024-12-11 RX ORDER — IBUPROFEN 800 MG/1
800 TABLET, FILM COATED ORAL EVERY 8 HOURS PRN
Status: DISCONTINUED | OUTPATIENT
Start: 2024-12-15 | End: 2024-12-13 | Stop reason: HOSPADM

## 2024-12-11 RX ORDER — SODIUM CHLORIDE, SODIUM LACTATE, POTASSIUM CHLORIDE, CALCIUM CHLORIDE 600; 310; 30; 20 MG/100ML; MG/100ML; MG/100ML; MG/100ML
2000 INJECTION, SOLUTION INTRAVENOUS PRN
Status: DISCONTINUED | OUTPATIENT
Start: 2024-12-11 | End: 2024-12-13 | Stop reason: HOSPADM

## 2024-12-11 RX ORDER — METHYLERGONOVINE MALEATE 0.2 MG/ML
INJECTION INTRAVENOUS PRN
Status: DISCONTINUED | OUTPATIENT
Start: 2024-12-11 | End: 2024-12-11 | Stop reason: SURG

## 2024-12-11 RX ORDER — ONDANSETRON 2 MG/ML
4 INJECTION INTRAMUSCULAR; INTRAVENOUS EVERY 6 HOURS PRN
Status: ACTIVE | OUTPATIENT
Start: 2024-12-11 | End: 2024-12-12

## 2024-12-11 RX ORDER — DOCUSATE SODIUM 100 MG/1
100 CAPSULE, LIQUID FILLED ORAL 2 TIMES DAILY PRN
Status: DISCONTINUED | OUTPATIENT
Start: 2024-12-11 | End: 2024-12-13 | Stop reason: HOSPADM

## 2024-12-11 RX ORDER — SODIUM CHLORIDE, SODIUM LACTATE, POTASSIUM CHLORIDE, AND CALCIUM CHLORIDE .6; .31; .03; .02 G/100ML; G/100ML; G/100ML; G/100ML
1000 INJECTION, SOLUTION INTRAVENOUS ONCE
Status: DISCONTINUED | OUTPATIENT
Start: 2024-12-11 | End: 2024-12-11 | Stop reason: HOSPADM

## 2024-12-11 RX ORDER — HALOPERIDOL 5 MG/ML
1 INJECTION INTRAMUSCULAR
Status: DISCONTINUED | OUTPATIENT
Start: 2024-12-11 | End: 2024-12-11 | Stop reason: HOSPADM

## 2024-12-11 RX ORDER — ROCURONIUM BROMIDE 10 MG/ML
INJECTION, SOLUTION INTRAVENOUS PRN
Status: DISCONTINUED | OUTPATIENT
Start: 2024-12-11 | End: 2024-12-11 | Stop reason: SURG

## 2024-12-11 RX ORDER — EPHEDRINE SULFATE 50 MG/ML
5 INJECTION, SOLUTION INTRAVENOUS
Status: DISCONTINUED | OUTPATIENT
Start: 2024-12-11 | End: 2024-12-11 | Stop reason: HOSPADM

## 2024-12-11 RX ADMIN — ACETAMINOPHEN 1000 MG: 500 TABLET ORAL at 09:56

## 2024-12-11 RX ADMIN — OXYTOCIN 75 ML/HR: 10 INJECTION, SOLUTION INTRAMUSCULAR; INTRAVENOUS at 04:43

## 2024-12-11 RX ADMIN — MAGNESIUM SULFATE HEPTAHYDRATE 2 G/HR: 40 INJECTION, SOLUTION INTRAVENOUS at 21:59

## 2024-12-11 RX ADMIN — AMPICILLIN SODIUM 1 G: 1 INJECTION, POWDER, FOR SOLUTION INTRAMUSCULAR; INTRAVENOUS at 00:33

## 2024-12-11 RX ADMIN — ONDANSETRON 4 MG: 2 INJECTION INTRAMUSCULAR; INTRAVENOUS at 03:51

## 2024-12-11 RX ADMIN — HYDROMORPHONE HYDROCHLORIDE 0.2 MG: 1 INJECTION, SOLUTION INTRAMUSCULAR; INTRAVENOUS; SUBCUTANEOUS at 05:17

## 2024-12-11 RX ADMIN — LIDOCAINE HYDROCHLORIDE AND EPINEPHRINE 20 ML: 20; 5 INJECTION, SOLUTION EPIDURAL; INFILTRATION; INTRACAUDAL; PERINEURAL at 02:31

## 2024-12-11 RX ADMIN — PHENYLEPHRINE HYDROCHLORIDE 200 MCG: 10 INJECTION INTRAVENOUS at 03:47

## 2024-12-11 RX ADMIN — ONDANSETRON 4 MG: 2 INJECTION INTRAMUSCULAR; INTRAVENOUS at 02:47

## 2024-12-11 RX ADMIN — AZITHROMYCIN MONOHYDRATE 500 MG: 500 INJECTION, POWDER, LYOPHILIZED, FOR SOLUTION INTRAVENOUS at 02:25

## 2024-12-11 RX ADMIN — SODIUM CHLORIDE, POTASSIUM CHLORIDE, SODIUM LACTATE AND CALCIUM CHLORIDE: 600; 310; 30; 20 INJECTION, SOLUTION INTRAVENOUS at 22:04

## 2024-12-11 RX ADMIN — ACETAMINOPHEN 1000 MG: 500 TABLET ORAL at 15:46

## 2024-12-11 RX ADMIN — MISOPROSTOL 800 MCG: 200 TABLET ORAL at 03:45

## 2024-12-11 RX ADMIN — METHYLERGONOVINE MALEATE 200 MCG: 0.2 INJECTION, SOLUTION INTRAMUSCULAR; INTRAVENOUS at 03:41

## 2024-12-11 RX ADMIN — CARBOPROST TROMETHAMINE 250 MCG: 250 INJECTION, SOLUTION INTRAMUSCULAR at 03:45

## 2024-12-11 RX ADMIN — DIPHENOXYLATE HYDROCHLORIDE AND ATROPINE SULFATE 1 TABLET: 2.5; .025 TABLET ORAL at 07:30

## 2024-12-11 RX ADMIN — CEFAZOLIN 3 G: 1 INJECTION, POWDER, FOR SOLUTION INTRAMUSCULAR; INTRAVENOUS at 03:15

## 2024-12-11 RX ADMIN — SUGAMMADEX 200 MG: 100 INJECTION, SOLUTION INTRAVENOUS at 04:08

## 2024-12-11 RX ADMIN — PHENYLEPHRINE HYDROCHLORIDE 200 MCG: 10 INJECTION INTRAVENOUS at 03:33

## 2024-12-11 RX ADMIN — FAMOTIDINE 20 MG: 10 INJECTION, SOLUTION INTRAVENOUS at 02:44

## 2024-12-11 RX ADMIN — MORPHINE SULFATE 3 MG: 0.5 INJECTION, SOLUTION EPIDURAL; INTRATHECAL; INTRAVENOUS at 03:44

## 2024-12-11 RX ADMIN — KETOROLAC TROMETHAMINE 15 MG: 15 INJECTION, SOLUTION INTRAMUSCULAR; INTRAVENOUS at 15:47

## 2024-12-11 RX ADMIN — DEXAMETHASONE SODIUM PHOSPHATE 4 MG: 4 INJECTION INTRA-ARTICULAR; INTRALESIONAL; INTRAMUSCULAR; INTRAVENOUS; SOFT TISSUE at 03:51

## 2024-12-11 RX ADMIN — HUMAN RHO(D) IMMUNE GLOBULIN 300 MCG: 1500 SOLUTION INTRAMUSCULAR; INTRAVENOUS at 12:13

## 2024-12-11 RX ADMIN — ENOXAPARIN SODIUM 60 MG: 100 INJECTION SUBCUTANEOUS at 17:53

## 2024-12-11 RX ADMIN — OXYCODONE HYDROCHLORIDE 5 MG: 5 SOLUTION ORAL at 07:39

## 2024-12-11 RX ADMIN — PROPOFOL 200 MG: 10 INJECTION, EMULSION INTRAVENOUS at 03:23

## 2024-12-11 RX ADMIN — ROCURONIUM BROMIDE 70 MG: 50 INJECTION, SOLUTION INTRAVENOUS at 03:23

## 2024-12-11 RX ADMIN — ACETAMINOPHEN 1000 MG: 500 TABLET ORAL at 21:55

## 2024-12-11 RX ADMIN — KETOROLAC TROMETHAMINE 15 MG: 15 INJECTION, SOLUTION INTRAMUSCULAR; INTRAVENOUS at 21:55

## 2024-12-11 RX ADMIN — METOCLOPRAMIDE 10 MG: 5 INJECTION, SOLUTION INTRAMUSCULAR; INTRAVENOUS at 02:44

## 2024-12-11 RX ADMIN — OXYTOCIN 1000 ML: 10 INJECTION, SOLUTION INTRAMUSCULAR; INTRAVENOUS at 03:37

## 2024-12-11 RX ADMIN — KETOROLAC TROMETHAMINE 15 MG: 15 INJECTION, SOLUTION INTRAMUSCULAR; INTRAVENOUS at 09:55

## 2024-12-11 RX ADMIN — SODIUM BICARBONATE 5 ML: 84 INJECTION, SOLUTION INTRAVENOUS at 02:31

## 2024-12-11 RX ADMIN — MAGNESIUM SULFATE HEPTAHYDRATE 2 G/HR: 40 INJECTION, SOLUTION INTRAVENOUS at 04:44

## 2024-12-11 RX ADMIN — SODIUM CITRATE AND CITRIC ACID MONOHYDRATE 30 ML: 334; 500 SOLUTION ORAL at 02:45

## 2024-12-11 RX ADMIN — SODIUM CHLORIDE, POTASSIUM CHLORIDE, SODIUM LACTATE AND CALCIUM CHLORIDE 1000 ML: 600; 310; 30; 20 INJECTION, SOLUTION INTRAVENOUS at 10:04

## 2024-12-11 ASSESSMENT — PAIN DESCRIPTION - PAIN TYPE
TYPE: SURGICAL PAIN
TYPE: ACUTE PAIN
TYPE: SURGICAL PAIN
TYPE: ACUTE PAIN
TYPE: SURGICAL PAIN
TYPE: ACUTE PAIN
TYPE: SURGICAL PAIN
TYPE: SURGICAL PAIN
TYPE: ACUTE PAIN

## 2024-12-11 ASSESSMENT — PAIN SCALES - GENERAL: PAIN_LEVEL: 0

## 2024-12-11 NOTE — OR SURGEON
Immediate Post OP Note    PreOp Diagnosis: Intrauterine pregnancy at 40 weeks and 1 day gestational age, arrest of dilation, Rh- status, large for gestational age fetus, preeclampsia with severe features      PostOp Diagnosis: Same      Procedure(s):   SECTION, PRIMARY    Surgeon(s):  Bear Nunez M.D.    Anesthesiologist/Type of Anesthesia:  Anesthesiologist: Joseph Weir M.D./Spinal    Surgical Staff:  * No surgical staff found *    Specimens removed if any:  * No specimens in log *    Estimated Blood Loss: 1500 mL    Findings: Viable male infant, weight 4080 g.  Apgars of 2, 8 and 9 in vertex presentation with clear amniotic fluid.  Normal uterus, tubes and ovaries.    Complications: None        2024 4:24 AM Bear Nunez M.D.

## 2024-12-11 NOTE — ANESTHESIA PROCEDURE NOTES
Airway    Date/Time: 12/11/2024 3:25 AM    Performed by: Joseph Weir M.D.  Authorized by: Joseph Weir M.D.    Location:  OR  Urgency:  Elective  Indications for Airway Management:  Anesthesia      Spontaneous Ventilation: absent    Sedation Level:  Deep  Preoxygenated: Yes    Patient Position:  Sniffing  Mask Difficulty Assessment:  1 - vent by mask  Final Airway Type:  Endotracheal airway  Final Endotracheal Airway:  ETT  Cuffed: Yes    Technique Used for Successful ETT Placement:  Direct laryngoscopy  Devices/Methods Used in Placement:  Cricoid pressure    Insertion Site:  Oral  Blade Type:  Hermes  Laryngoscope Blade/Videolaryngoscope Blade Size:  3  ETT Size (mm):  7.0  Measured from:  Teeth  ETT to Teeth (cm):  22  Placement Verified by: auscultation and capnometry    Cormack-Lehane Classification:  Grade I - full view of glottis  Number of Attempts at Approach:  1

## 2024-12-11 NOTE — OP REPORT
DATE OF SERVICE: 2024     PREOPERATIVE DIAGNOSES:  1.  Intrauterine pregnancy at 40w1d  2.  Arrest of dilation  3.  Rh- status  4.  Large for gestational age fetus  5.  Preeclampsia severe features     POSTOPERATIVE DIAGNOSES:  1.  Intrauterine pregnancy at 40w1d  2.  same    PROCEDURE PERFORMED: Primary low transverse  section.     SURGEON: Bear Nunez MD     ASSISTANT: Ashok Rucker DO     ANESTHESIA: General Endotracheal anesthesia     ANESTHESIOLOGIST: Joseph Weir MD     SPECIMEN: Cord gases     ESTIMATED BLOOD LOSS: 1500 mL     FINDINGS:  A viable male infant, weight 4080 g, Apgars of 2, 8 and 9 in vertex    presentation with clear amniotic fluid.  There was a normal uterus,   tubes, and ovaries bilaterally.     COMPLICATIONS:  None.     PROCEDURE:  After appropriate consents were obtained, the patient was taken to the operating room.  Patient had an epidural during labor but it was found to be inadequate for pain coverage.  Conversion to general endotracheal anesthesia was made.  A timeout was performed.  The patient was then prepped and draped in the usual sterile manner.   A Pfannenstiel incision was made with a scalpel 3cm superior to the pubic symphysis and extended down to the rectus fascia.  The rectus fascia was incised with the scalpel and tented up. The underlying rectus muscle was  from the fascia first inferiorly and then superiorly using the tucker scissors.  The rectus muscle was  bluntly in the midline.  The peritoneum was entered bluntly in the midline. The peritoneum incision was extended superiorly and inferiorly with the Metzenbaum scissors with great care to avoid injury to underlying bowel or bladder.  Toby retractor was placed. The vesicouterine peritoneum was tented up and entered with Metzenbaum scissors, and a bladder flap was created.  An incision was made into the lower uterine segment transversely and the incision was extended bluntly.   Amniotomy was performed and there was noted to be clear amniotic fluid. The Infant's head was grasped and delivered atraumatically followed by the remainder of the body without any complications.  The mouth and nares were suctioned. The cord was doubly clamped and cut and the infant was handed off to awaiting neonatology team.  The placenta was then allowed to spontaneously deliver. The uterus was cleared of clots and debris.  The hysterotomy incision was reapproximated with 1-0 Vicryl suture in a running locked fashion. A second layer of the same suture was placed to imbricate the incision creating a 2 layer closure.  Significant atony was noted.  Patient was given Pitocin, along with TXA, Hemabate and rectal Cytotec.  Hemostasis was noted.  The tubes and ovaries were examined and noted to be normal.  The pelvis was irrigated with normal saline. The pericolic gutters were examined and any blood clots were removed.  The Toby retractor was removed. The peritoneum was reapproximated with 3-0 Vicryl suture running.  The rectus muscles were examined and hemostatic.  The fascia was reapproximated with 0 Vicryl suture running.  The subcutaneous fat was irrigated and any small bleeders were bovied for hemostasis.  The subcutaneous fat was then reapproximated with 3-0 Vicryl suture running.  The skin was reapproximated with surgical staples and a Mepilex dressing was placed.  Sponge, needle, instrument, and lap counts were correct x2.  Patient tolerated the procedure well and went to recovery room in stable condition.        ____________________________________  Bear Nunez MD

## 2024-12-11 NOTE — PROGRESS NOTES
Pt states he is feeling significant pain in his back with the UCs and pressure in the anterior area. Pt denies pain from UCs or strong pressure, but is still very uncomfortable in most positions, notably in the back. D/w pt plan to check and place IUPC and pt is agreeable to plan.     Cervix: 6-7/90/-2, IUPC placed easily, some caput noted  NST: 130bpm, +accels, one variable decel to 90 with quick return to baseline, mod variability.  TOCO: UCs q 3-5 min  Pit: 8mU/min    A/P: IUP at 40w0d - continue pain control with epidural, incr pit prn, position changes to help with fetal position, guarded for vaginal delivery, as little cervical change over past 2 hours.

## 2024-12-11 NOTE — ANESTHESIA POSTPROCEDURE EVALUATION
Patient: Bora Rodrigez    Procedure Summary       Date: 12/10/24 Room / Location: [facility] OR 01 / SURGERY LABOR AND DELIVERY    Anesthesia Start:  Anesthesia Stop: 24    Procedure:  SECTION, PRIMARY (Abdomen) Diagnosis: (Failure to progress)    Surgeons: Bear Nunez M.D. Responsible Provider: Joseph Weir M.D.    Anesthesia Type: epidural ASA Status: 2            Final Anesthesia Type: epidural  Last vitals  BP   Blood Pressure: (!) 167/93    Temp   36.5 °C (97.7 °F)    Pulse   98   Resp   18    SpO2   95 %      Anesthesia Post Evaluation    Patient location during evaluation: PACU  Patient participation: complete - patient participated  Level of consciousness: awake and alert  Pain score: 0    Airway patency: patent  Anesthetic complications: no  Cardiovascular status: hemodynamically stable  Respiratory status: acceptable  Hydration status: euvolemic    PONV: none          No notable events documented.     Nurse Pain Score: 9 (NPRS)

## 2024-12-11 NOTE — PROGRESS NOTES
0700- Report received from Ximena BOOTH. Pt resting comfortably in stable condition.     0840- Dr. Menjivar updated on pt BP status. Pt okayed to move to postpartum. Orders received for BP monitoring.    0845- Pt transferred to  in stable condition via gurney with all belongings. Report given to Araceli BOOTH. Pt resting comfortably.

## 2024-12-11 NOTE — PROGRESS NOTES
Per RN, called to room as patient would like to discuss options. Per RN Ximena, cervical exam is unchanged and back pain has improved after many position changes.     NST: 140bpm, +accels, no decels, mod variability  IUPC: UCs q 3 min, but inadequate UCs  Cervix: 6-7/90/-2  Pit 8mU/min    A/P: IUP at 40w0d - Consider primary C/S due to FTP, which pt is considering, as no improvement in dilation or station. Pt considering all options, including incr Pit and more position changes, but will talk to MD about C/S prn.

## 2024-12-11 NOTE — PROGRESS NOTES
190 - Report received from EMMY Chaidez. POC discussed.    193 - Dr. Weir called to bedside for epidural bolus.     014 - Pt requesting ERIC Calixto and Dr Nunez to bedside to discuss primary  section. Both MD and PA were called and will come to bedside as soon as their procedure is through.     020 - Dr. Nunez at bedside.  section called. Per MD, discontinue magnesium infusion immediately prior to moving to OR and restart in PACU.    0304 - In OR.    0416 - In PACU.    0700 - Report given to EMMY Jaeger. POC discussed.

## 2024-12-11 NOTE — CARE PLAN
The patient is Stable - Low risk of patient condition declining or worsening    Shift Goals  Clinical Goals: cervical change, category 1 fetal heart tracing  Patient Goals: healthy parents, healthy baby  Family Goals: support    Progress made toward(s) clinical / shift goals:    Problem: Knowledge Deficit - L&D  Goal: Patient and family/caregivers will demonstrate understanding of plan of care, disease process/condition, diagnostic tests and medications  Outcome: Progressing   Pt continually updated and educated on ongoing POC   Problem: Pain  Goal: Patient's pain will be alleviated or reduced to the patient’s comfort goal  Outcome: Progressing   Pt states he is experiencing adequate pain control

## 2024-12-11 NOTE — PROGRESS NOTES
29-year-old  2 para 0-0-1-0 at 40 weeks 1 day gestational age.  Presents for induction of labor.  Pregnancy complicated by Rh- status, large for gestational age with AC greater than 98 percentile.  Labor now complicated by preeclampsia with severe features by blood pressure criteria.    Patient was undergoing duction labor and fortunately arrest at approximately 7 cm dilation despite adequate contractions.  Plan for primary low-transverse  at this time.    Discussed with the patient the risks of  delivery. The risks include DVT/pulmonary embolism, pelvic scarring, pelvic pain, infection, bleeding, scarring, damage to other organs in the area of operation, anesthesia complications, and death. Specifically organs that can be damaged are bowel, bladder, ureters. I also discussed with the patient the risk of wound infection and wound breakdown. We discussed that these risks are greater in people that have diabetes or obesity. I also discussed the risk of emergency blood transfusion during procedure as well as emergency hysterectomy during procedure. Patient had the opportunity to ask questions regarding procedures. All questions answered to the patient's satisfaction. Pt agrees to proceed with surgery.

## 2024-12-11 NOTE — ANESTHESIA PROCEDURE NOTES
Epidural Block    Date/Time: 12/10/2024 4:41 PM    Performed by: Joseph Weir M.D.  Authorized by: Joseph Weir M.D.    Patient Location:  OB  Start Time:  12/10/2024 4:41 PM  End Time:  12/10/2024 5:00 PM  Reason for Block: labor analgesia    patient identified, IV checked, site marked, risks and benefits discussed, surgical consent, monitors and equipment checked and pre-op evaluation    Patient Position:  Sitting  Prep: ChloraPrep, patient draped and sterile technique    Monitoring:  Blood pressure, continuous pulse oximetry and heart rate  Approach:  Midline  Location:  L3-L4  Injection Technique:  GHASSAN saline  Skin infiltration:  Lidocaine  Strength:  1%  Dose:  5ml  Needle Type:  Tuohy  Needle Gauge:  17 G  Needle Length:  3.5 in  Loss of resistance::  9  Catheter Size:  19 G  Catheter at Skin Depth:  13  Test Dose Result:  Negative   26g pencil to csf for dural puncture. No drugs given.

## 2024-12-11 NOTE — PROGRESS NOTES
0900 -- Patient admitted to room S325 via gurney with significant other, infant, and L&D RN. Report received from Heide AUGUSTIN RN. VSS.  in use. SCDs in use. IS at bedside, patient demonstrates proper use. Denies pain at this time. Oriented to unit, room, infant feeding policy, call light, emergency pull cord, and plan of care. SO and patient express understanding. Call light within reach, encouraged to call for assistance.    1215 -- Patient ambulated to wheelchair and patient taken up to NICU to see infant by this RN.     1300 -- Blood pressure not obtained due to being in the NICU

## 2024-12-11 NOTE — ANESTHESIA PREPROCEDURE EVALUATION
Date: 12/10/24  Procedure: Labor Epidural         Relevant Problems   ANESTHESIA   (positive) PONV (postoperative nausea and vomiting)       Physical Exam    Airway   Mallampati: II  TM distance: >3 FB  Neck ROM: full       Cardiovascular - normal exam  Rhythm: regular  Rate: normal  (-) murmur     Dental - normal exam           Pulmonary - normal exam  Breath sounds clear to auscultation     Abdominal    Neurological - normal exam                   Anesthesia Plan    ASA 2       Plan - epidural   Neuraxial block will be labor analgesia                  Pertinent diagnostic labs and testing reviewed    Informed Consent:    Anesthetic plan and risks discussed with patient.

## 2024-12-11 NOTE — CARE PLAN
The patient is Stable - Low risk of patient condition declining or worsening    Shift Goals  Clinical Goals: VSS, lochia WDL, pain WDL  Patient Goals:   Family Goals:     Progress made toward(s) clinical / shift goals:  VSS, lochia scant, pain controlled with scheduled medications and rest. Encouraged patient to call with any needs. Will encourage ambulation when off of Mag      Problem: Knowledge Deficit - Postpartum  Goal: Patient will verbalize and demonstrate understanding of self and infant care  Outcome: Progressing     Problem: Altered Physiologic Condition  Goal: Patient physiologically stable as evidenced by normal lochia, palpable uterine involution and vitals within normal limits  Outcome: Progressing     Problem: Psychosocial - Postpartum  Goal: Patient will verbalize and demonstrate effective bonding and parenting behavior  Outcome: Progressing

## 2024-12-12 LAB
ERYTHROCYTE [DISTWIDTH] IN BLOOD BY AUTOMATED COUNT: 49.3 FL (ref 35.9–50)
HCT VFR BLD AUTO: 28.7 % (ref 37–47)
HGB BLD-MCNC: 9.5 G/DL (ref 12–16)
MAGNESIUM SERPL-MCNC: 6.1 MG/DL (ref 1.5–2.5)
MCH RBC QN AUTO: 28.4 PG (ref 27–33)
MCHC RBC AUTO-ENTMCNC: 33.1 G/DL (ref 32.2–35.5)
MCV RBC AUTO: 85.9 FL (ref 81.4–97.8)
PLATELET # BLD AUTO: 179 K/UL (ref 164–446)
PMV BLD AUTO: 11.2 FL (ref 9–12.9)
RBC # BLD AUTO: 3.34 M/UL (ref 4.2–5.4)
WBC # BLD AUTO: 10 K/UL (ref 4.8–10.8)

## 2024-12-12 PROCEDURE — 85027 COMPLETE CBC AUTOMATED: CPT

## 2024-12-12 PROCEDURE — 700111 HCHG RX REV CODE 636 W/ 250 OVERRIDE (IP): Performed by: FAMILY MEDICINE

## 2024-12-12 PROCEDURE — 36415 COLL VENOUS BLD VENIPUNCTURE: CPT

## 2024-12-12 PROCEDURE — A9270 NON-COVERED ITEM OR SERVICE: HCPCS | Performed by: ANESTHESIOLOGY

## 2024-12-12 PROCEDURE — 700102 HCHG RX REV CODE 250 W/ 637 OVERRIDE(OP): Performed by: ANESTHESIOLOGY

## 2024-12-12 PROCEDURE — 700111 HCHG RX REV CODE 636 W/ 250 OVERRIDE (IP): Mod: JZ | Performed by: ANESTHESIOLOGY

## 2024-12-12 PROCEDURE — 700102 HCHG RX REV CODE 250 W/ 637 OVERRIDE(OP): Performed by: OBSTETRICS & GYNECOLOGY

## 2024-12-12 PROCEDURE — 770002 HCHG ROOM/CARE - OB PRIVATE (112)

## 2024-12-12 PROCEDURE — A9270 NON-COVERED ITEM OR SERVICE: HCPCS | Performed by: OBSTETRICS & GYNECOLOGY

## 2024-12-12 PROCEDURE — 83735 ASSAY OF MAGNESIUM: CPT

## 2024-12-12 RX ADMIN — ACETAMINOPHEN 1000 MG: 500 TABLET ORAL at 15:32

## 2024-12-12 RX ADMIN — ACETAMINOPHEN 1000 MG: 500 TABLET ORAL at 09:12

## 2024-12-12 RX ADMIN — IBUPROFEN 800 MG: 800 TABLET, FILM COATED ORAL at 18:14

## 2024-12-12 RX ADMIN — ENOXAPARIN SODIUM 60 MG: 100 INJECTION SUBCUTANEOUS at 18:15

## 2024-12-12 RX ADMIN — KETOROLAC TROMETHAMINE 15 MG: 15 INJECTION, SOLUTION INTRAMUSCULAR; INTRAVENOUS at 03:46

## 2024-12-12 RX ADMIN — ACETAMINOPHEN 1000 MG: 500 TABLET ORAL at 03:46

## 2024-12-12 RX ADMIN — IBUPROFEN 800 MG: 800 TABLET, FILM COATED ORAL at 09:12

## 2024-12-12 ASSESSMENT — PAIN DESCRIPTION - PAIN TYPE
TYPE: ACUTE PAIN;SURGICAL PAIN
TYPE: SURGICAL PAIN
TYPE: SURGICAL PAIN
TYPE: ACUTE PAIN
TYPE: SURGICAL PAIN
TYPE: SURGICAL PAIN
TYPE: ACUTE PAIN;SURGICAL PAIN
TYPE: SURGICAL PAIN
TYPE: ACUTE PAIN

## 2024-12-12 ASSESSMENT — EDINBURGH POSTNATAL DEPRESSION SCALE (EPDS)
I HAVE FELT SAD OR MISERABLE: NOT VERY OFTEN
THE THOUGHT OF HARMING MYSELF HAS OCCURRED TO ME: NEVER
I HAVE BLAMED MYSELF UNNECESSARILY WHEN THINGS WENT WRONG: NOT VERY OFTEN
I HAVE BEEN SO UNHAPPY THAT I HAVE BEEN CRYING: ONLY OCCASIONALLY
I HAVE LOOKED FORWARD WITH ENJOYMENT TO THINGS: AS MUCH AS I EVER DID
I HAVE BEEN SO UNHAPPY THAT I HAVE HAD DIFFICULTY SLEEPING: NOT AT ALL
I HAVE BEEN ABLE TO LAUGH AND SEE THE FUNNY SIDE OF THINGS: NOT QUITE SO MUCH NOW
I HAVE BEEN ANXIOUS OR WORRIED FOR NO GOOD REASON: NO, NOT AT ALL
THINGS HAVE BEEN GETTING ON TOP OF ME: NO, MOST OF THE TIME I HAVE COPED QUITE WELL
I HAVE FELT SCARED OR PANICKY FOR NO GOOD REASON: NO, NOT AT ALL

## 2024-12-12 NOTE — PROGRESS NOTES
1900: Report received from EMMY Barajas. Assumed care of pt.     1930: Assessment complete. Education on call light and emergency light. Reinforced expected lochia color and amount and when to call RN. Education on calling and waiting for help while on magnesium sulfate, pt verbalizes understanding.     0022: JOSSELIN Torres, notified that pt's urine has been pink tinged, and that day shift reported this has been consistent throughout the previous shift. Orders received to continue monitoring and update as needed if urine becomes increasingly pink.     0245: JOSSELIN Torres notified that pt reported chills, temporal temperature of 97.2 (f) and oral temperature of 97.0. Other vital signs WNL. Deep tendon reflexes 2+, no clonus, pt denies shortness of breath, no respiratory depression noted. Orders received for serum magnesium draw now and to have lab draw CBC at the same time and to update with results. Will discontinue magnesium sulfate at 0330. Ok to take catheter out at same time as urine is again yellow with slightly pink tinge.

## 2024-12-12 NOTE — CARE PLAN
The patient is Stable - Low risk of patient condition declining or worsening    Shift Goals  Clinical Goals: VSS, Fundus firm with light lochia, pain control  Patient Goals: visit infant in NICU, bond with baby, pain control  Family Goals: support    Progress made toward(s) clinical / shift goals:  Patient coping well, infant back from NICU, and prior to, parents were visiting and mother was pumping. Bela, tolerating pain well and ambulating frequently and has had a bowel movement and lochia is light.    Patient is not progressing towards the following goals:

## 2024-12-12 NOTE — PROGRESS NOTES
Report received. Patient declines needs for pain meds. Will go visit infant in NICU for 0730 care time.

## 2024-12-12 NOTE — PROGRESS NOTES
Obstetrics & Gynecology Post-Delivery Progress Note    Date of Service  2024    29 y.o.  s/p  for arrest of dilation  Delivery date: 2024    Hospital Course  Pt was admitted for IOL s/t obesity. His delivery was complicated by severe range pressures in labor. Magnesium sulfate was initiated for seizure prophylaxis. A  was ultimately called for an arrest of dilation. Blood loss was 1547. Magnesium was stopped this morning. His postpartum course has been otherwise unremarkable.     Subjective  Pain: controlled with medications  Bleeding: lochia moderate  PO's: taking regular diet  Voiding: ott discontinued, awaiting spontaneous void  Ambulating: yes  Passing flatus: No  Feeding:  partner planning to breastfeed    Objective  Temp:  [36.1 °C (97 °F)-37.5 °C (99.5 °F)] 36.9 °C (98.4 °F)  Pulse:  [] 74  Resp:  [16-20] 17  BP: (105-157)/(56-88) 114/65  SpO2:  [92 %-100 %] 100 %    Physical Exam  General: well  Chest/Breasts: nipples intact   Abdomen: non-distended  Fundus: at umbilicus  Incision: dressing clean, dry, intact  Perineum: deferred  Extremities: symmetric, calves nontender    Recent Labs     12/10/24  0007 12/10/24  2056 24  0328   WBC 7.9 15.2* 10.0   RBC 4.62 4.82 3.34*   HEMOGLOBIN 12.8 13.1 9.5*   HEMATOCRIT 38.4 39.6 28.7*   MCV 83.1 82.2 85.9   MCH 27.7 27.2 28.4   RDW 45.3 44.9 49.3   PLATELETCT 166 186 179   MPV 11.8 11.5 11.2   NEUTSPOLYS 69.80  --   --    LYMPHOCYTES 21.50*  --   --    MONOCYTES 6.90  --   --    EOSINOPHILS 0.60  --   --    BASOPHILS 0.40  --   --        Assessment/Plan  Bora Rodrigez is a 29 y.o.yo  s/p postop day #1  s/p  for failure to progress    1. Post care: meeting all goals  2. Hemodynamics: stable  3. Pain: controlled  4. Rh-, Rubella Immune  5. Method of Feeding:  partner planning to chest feed  6. Method of Contraception:  not addressed  7. Postpartum hemorrhage  8. Disposition: likely home  postpartum day 2-3    VTE prophylaxis: none indicated

## 2024-12-12 NOTE — CARE PLAN
The patient is Watcher - Medium risk of patient condition declining or worsening    Shift Goals  Clinical Goals: Lochia WNL, VSS  Patient Goals: rest;vist infant in NICU  Family Goals: support    Progress made toward(s) clinical / shift goals:    Problem: Altered Physiologic Condition  Goal: Patient physiologically stable as evidenced by normal lochia, palpable uterine involution and vitals within normal limits  Outcome: Progressing  Note: VSS, lochia WNL, uterus is palpable and involuting appropriately. Pt is on magnesium sulfate and vital signs are q1 hour while on magnesium. Urine output is WNL, deep tendon reflexes WNL, and no clonus noted.      Problem: Infection - Postpartum  Goal: Postpartum patient will be free of signs and symptoms of infection  Outcome: Progressing  Note: No s/s of infection this shift. Education provided on s/s of infection.        Patient is not progressing towards the following goals:

## 2024-12-13 ENCOUNTER — PHARMACY VISIT (OUTPATIENT)
Dept: PHARMACY | Facility: MEDICAL CENTER | Age: 29
End: 2024-12-13
Payer: COMMERCIAL

## 2024-12-13 VITALS
BODY MASS INDEX: 41.78 KG/M2 | SYSTOLIC BLOOD PRESSURE: 128 MMHG | DIASTOLIC BLOOD PRESSURE: 90 MMHG | WEIGHT: 260 LBS | RESPIRATION RATE: 18 BRPM | OXYGEN SATURATION: 99 % | TEMPERATURE: 97.2 F | HEART RATE: 79 BPM | HEIGHT: 66 IN

## 2024-12-13 PROCEDURE — 700102 HCHG RX REV CODE 250 W/ 637 OVERRIDE(OP): Performed by: OBSTETRICS & GYNECOLOGY

## 2024-12-13 PROCEDURE — RXMED WILLOW AMBULATORY MEDICATION CHARGE

## 2024-12-13 PROCEDURE — A9270 NON-COVERED ITEM OR SERVICE: HCPCS | Performed by: OBSTETRICS & GYNECOLOGY

## 2024-12-13 RX ORDER — IBUPROFEN 800 MG/1
TABLET, FILM COATED ORAL
Qty: 30 TABLET | Refills: 0 | Status: SHIPPED | OUTPATIENT
Start: 2024-12-13 | End: 2025-01-12

## 2024-12-13 RX ORDER — ACETAMINOPHEN 500 MG
TABLET ORAL
Qty: 30 TABLET | Refills: 0 | Status: SHIPPED | OUTPATIENT
Start: 2024-12-13 | End: 2025-01-12

## 2024-12-13 RX ORDER — ENOXAPARIN SODIUM 100 MG/ML
60 INJECTION SUBCUTANEOUS DAILY
Qty: 42 EACH | Refills: 0 | Status: ACTIVE | OUTPATIENT
Start: 2024-12-13 | End: 2025-01-24

## 2024-12-13 RX ORDER — PSEUDOEPHEDRINE HCL 30 MG
100 TABLET ORAL 2 TIMES DAILY PRN
Qty: 60 CAPSULE | Refills: 0 | Status: SHIPPED | OUTPATIENT
Start: 2024-12-13

## 2024-12-13 RX ORDER — FERROUS SULFATE 325(65) MG
325 TABLET ORAL DAILY
Qty: 30 TABLET | Refills: 11 | Status: SHIPPED | OUTPATIENT
Start: 2024-12-13

## 2024-12-13 RX ADMIN — ACETAMINOPHEN 1000 MG: 500 TABLET ORAL at 05:57

## 2024-12-13 RX ADMIN — ACETAMINOPHEN 1000 MG: 500 TABLET ORAL at 00:27

## 2024-12-13 RX ADMIN — IBUPROFEN 800 MG: 800 TABLET, FILM COATED ORAL at 05:57

## 2024-12-13 RX ADMIN — ACETAMINOPHEN 1000 MG: 500 TABLET ORAL at 11:54

## 2024-12-13 ASSESSMENT — PAIN DESCRIPTION - PAIN TYPE
TYPE: SURGICAL PAIN
TYPE: SURGICAL PAIN

## 2024-12-13 NOTE — CARE PLAN
The patient is Stable - Low risk of patient condition declining or worsening    Shift Goals  Clinical Goals: pain control, lochia WDL, bond  Patient Goals: Bonding  Family Goals: Bonding and support    Progress made toward(s) clinical / shift goals:      Problem: Knowledge Deficit - Postpartum  Goal: Patient will verbalize and demonstrate understanding of self and infant care  Description: Target End Date:  1-3 days or as soon as patient condition allows    Document in Patient Education    1.  Assess patient and knowledge of self and infant care  2.  Educate patient verbally, by demonstration and written material  Outcome: Progressing     Problem: Psychosocial - Postpartum  Goal: Patient will verbalize and demonstrate effective bonding and parenting behavior  Description: Target End Date:  1 to 4 days    1.  Assess patient for anxiety or apprehension regarding parenting role  2.  Provide emotional support and encouragement to patient/family/caregiver  Outcome: Progressing       Patient is not progressing towards the following goals:

## 2024-12-13 NOTE — CARE PLAN
The patient is Stable - Low risk of patient condition declining or worsening    Shift Goals  Clinical Goals: VSS, lochia WDL, pain WDL  Patient Goals:   Family Goals:     Progress made toward(s) clinical / shift goals:  VSS, lochia scant, pain controlled with medications. RX sent. Patient stable to discharge home with family and infant.       Problem: Knowledge Deficit - Postpartum  Goal: Patient will verbalize and demonstrate understanding of self and infant care  Outcome: Progressing     Problem: Infection - Postpartum  Goal: Postpartum patient will be free of signs and symptoms of infection  Outcome: Progressing

## 2024-12-13 NOTE — DISCHARGE PLANNING
Discharge Planning Assessment Post Partum    Reason for Referral: History of depression   Address: [address] [place] [address]  Type of Living Situation: stable housing   Mom Diagnosis: Pregnancy,    Baby Diagnosis: -40.1 weeks   Primary Language: English     Name of Baby: Higinio (: 24)  Father of the Baby: Bora Rodrigez   Contact Information: [phone]  Mother of the Baby: Bindu Duque   Contact Information: [phone]    Prenatal Care: Yes-[facility] starting at 9 weeks   Mom's PCP: Dr. Elvie Younger   PCP for new baby: Dr. LuAnn [facility]     Support System: MOB and FOB  Coping/Bonding between mother & baby: Yes  Source of Feeding: formula and MOB is stimulating lactation   Supplies for Infant: Yes    Mom's Insurance: [employer]   Baby Covered on Insurance: Yes  Mother Employed/School: [employer]   Other children in the home/names & ages: first baby     Financial Hardship/Income: No   Mom's Mental status: alert and oriented   Services used prior to admit: None     CPS History: No  Psychiatric History: History of depression.  MOB scored a 5 on the EPDS screen.  Discussed with family and provided PPD resources.  Domestic Violence History: No  Drug/ETOH History: No    Resources Provided: post partum support and counseling resources, children and family resource list, and diaper bank assistance resources   Referrals Made: diaper bank referrals provided      Clearance for Discharge: Infant is cleared to discharge home with parents once medically cleared

## 2024-12-13 NOTE — DISCHARGE INSTRUCTIONS
PATIENT DISCHARGE EDUCATION INSTRUCTION SHEET    REASONS TO CALL YOUR OBSTETRICIAN  Persistent fever, shaking, chills (Temperature higher than 100.4) may indicate you have an infection  Heavy bleeding: soaking more than 1 pad per hour; Passing clots an egg-sized clot or bigger may mean you have an postpartum hemorrhage  Foul odor from vagina or bad smelling or discolored discharge or blood  Urinary pain, burning or frequency  Incision that is not healing, increased redness, swelling, tenderness or pain, or any pus from episiotomy or  site may mean you have an infection  Redness, swelling, warmth, or painful to touch in the calf area of your leg may mean you have a blood clot  Severe or intensified depression, thoughts or feelings of wanting to hurt yourself or someone else   Pain in chest, obstructed breathing or shortness of breath (trouble catching your breath) may mean you are having a postpartum complication. Call your provider immediately   Headache that does not get better, even after taking medicine, a bad headache with vision changes or pain in the upper right area of your belly may mean you have high blood pressure or post birth preeclampsia. Call your provider immediately    HAND WASHING  All family and friends should wash their hands:  Before and after holding the baby  Before feeding the baby  After using the restroom or changing the baby's diaper    WOUND CARE  Ask your physician for additional care instructions. In general:   Incision:  May shower and pat incision dry   Keep the incision clean and dry  There should not be any opening or pus from the incision  Continue to walk at home 3 times a day   Do NOT lift anything heavier than your baby (over 10 pounds)  Encourage family to help participate in care of the  to allow rest and mom time to heal    VAGINAL CARE AND BLEEDING  Nothing inside vagina for 6 weeks:   No sexual intercourse, tampons or douching  Bleeding may continue  "for 2-4 weeks. Amount and color may vary  Soaking 1 pad or more in an hour for several hours is considered heavy bleeding  Passing large egg sized blood clots can be concerning  If you feel like you have heavy bleeding or are having increasing amount of blood clots call your Obstetrician immediately  If you begin feeling faint upon standing, feeling sick to your stomach, have clammy skin, a really fast heartbeat, have chills, start feeling confused, dizzy, sleepy or weak, or feeling like you're going to faint call your Obstetrician immediately    HYPERTENSION   Preeclampsia or gestational hypertension are types of high blood pressure that only pregnant women can get. It is important for you to be aware of symptoms to seek early intervention and treatment. If you have any of these symptoms immediately call your Obstetrician    Vision changes or blurred vision   Severe headache or pain that is unrelieved with medication and will not go away  Persistent pain in upper abdomen or shoulder   Increased swelling of face, feet, or hands  Difficulty breathing or shortness of breath at rest  Urinating less than usual    URINATION AND BOWEL MOVEMENTS  Eating more fiber (bran cereal, fruits, and vegetables) and drinking plenty of fluids will help to avoid constipation  Urinary frequency and urgency after childbirth is normal  If you experience any urinary pain, burning or frequency call your provider    BIRTH CONTROL  It is possible to become pregnant at any time after delivery and while breastfeeding  Plan to discuss a method of birth control with your physician at your post delivery follow up visit    POSTPARTUM BLUES  During the first few days after birth, you may experience a sense of the \"blues\" which may include impatience, irritability or even crying. These feelings come and go quickly. However, as many as 1 in 10 women experience emotional symptoms known as postpartum depression.     POSTPARTUM DEPRESSION    May start as " "early as the second or third day after delivery or take several weeks or months to develop. Symptoms of \"blues\" are present, but are more intense: Crying spells; loss of appetite; feelings of hopelessness or loss of control; fear of touching the baby; over concern or no concern at all about the baby; little or no concern about your own appearance/caring for yourself; and/or inability to sleep or excessive sleeping. Contact your Obstetrician if you are experiencing any of these symptoms     PREVENTING SHAKEN BABY  If you are angry or stressed, PUT THE BABY IN THE CRIB, step away, take some deep breaths, and wait until you are calm to care for the baby. DO NOT SHAKE THE BABY. You are not alone, call a supporter for help.  [facility] 24/7 crisis call line (573-842-2188) or (1-453.896.2221)  You can also text them, text \"ANSWER\" (661438)  "

## 2024-12-13 NOTE — PROGRESS NOTES
0045- Assumed care of patient, report received from Sarah BOOTH.     0100- Assessment completed. Fundus firm, lochia scant. POC reviewed with POB. Verbalized understanding. No further questions at this time. Patient educated on emergency call light. Call light within reach.

## 2024-12-13 NOTE — DISCHARGE SUMMARY
Discharge Summary:      Bora Rodrigez    Admit Date:   2024  Discharge Date:  2024     Admitting diagnosis:  Encounter for induction of labor [Z34.90]  Discharge Diagnosis: Status post  for failure to progress.  Pregnancy Complications: preeclampsia  Tubal Ligation:  no        History:  Past Medical History:   Diagnosis Date    Allergy     Depression     Gynecological disorder 10/16/2023    the cyst I am having removed was discovered    Head ache     Migraine     PONV (postoperative nausea and vomiting) 2024    This is the only time I have experienced this, and have been anesthetized 3 times total. Likely because this was the longest and most complex procedure I've had (double mastectomy with nipple grafts)    Pregnant 2024     OB History    Para Term  AB Living   2 1 1 0 1 1   SAB IAB Ectopic Molar Multiple Live Births   1 0 0 0 0 1      # Outcome Date GA Lbr Willi/2nd Weight Sex Type Anes PTL Lv   2 Term 24 40w1d   M CS-LTranv EPI, Gen N DOM      Complications: Failure to Progress in First Stage   1 SAB 2023              Birth Comments: D&C at [place]        Cottonseed oil  Patient Active Problem List    Diagnosis Date Noted    Encounter for induction of labor 2024    Excessive fetal growth affecting management of mother in third trimester, antepartum 2024    Rh negative state in antepartum period 2024    Transgender 2024    Obesity in pregnancy 2024    PONV (postoperative nausea and vomiting) 2024    Prenatal care, subsequent pregnancy 2024    Left ovarian cyst 2024        Hospital Course:   29 y.o. , now para 1, was admitted with the above mentioned diagnosis, underwent Induction of Labor for preeclampsia, a primary  was performed due to failure to progress. Patient postpartum course was unremarkable, with progressive advancement in diet , ambulation and toleration of oral analgesia.  Patient without complaints today and desires discharge.      Vitals:    12/12/24 1814 12/12/24 2200 12/13/24 0200 12/13/24 0600   BP: 135/79 127/72 133/73 131/81   Pulse: 81 66 71 65   Resp: 18 17 18 18   Temp: 36.1 °C (96.9 °F) 37.4 °C (99.4 °F) 36.6 °C (97.8 °F) 36.8 °C (98.3 °F)   TempSrc: Temporal Temporal Temporal Temporal   SpO2: 98% 99% 99% 98%   Weight:       Height:           Current Facility-Administered Medications   Medication Dose    NS infusion      oxytocin (Pitocin) infusion (for post delivery)  125 mL/hr    oxytocin (Pitocin) injection 10 Units  10 Units    lactated ringers infusion      NS infusion      ibuprofen (Motrin) tablet 800 mg  800 mg    Followed by    [START ON 12/15/2024] ibuprofen (Motrin) tablet 800 mg  800 mg    acetaminophen (Tylenol) tablet 1,000 mg  1,000 mg    Followed by    [START ON 12/15/2024] acetaminophen (Tylenol) tablet 1,000 mg  1,000 mg    oxyCODONE immediate-release (Roxicodone) tablet 5 mg  5 mg    oxyCODONE immediate release (Roxicodone) tablet 10 mg  10 mg    ondansetron (Zofran) syringe/vial injection 4 mg  4 mg    Or    ondansetron (Zofran ODT) dispertab 4 mg  4 mg    diphenhydrAMINE (Benadryl) tablet/capsule 25 mg  25 mg    Or    diphenhydrAMINE (Benadryl) injection 25 mg  25 mg    docusate sodium (Colace) capsule 100 mg  100 mg    calcium GLUConate injection 1 g  1 g    magnesium sulfate 40 g/1000mL infusion  2 g/hr    enoxaparin (Lovenox) inj 60 mg  60 mg    lactated ringers infusion  2,000 mL    lactated ringers infusion      magnesium sulfate 40 g/1000mL infusion  2 g/hr    LR infusion      oxytocin (Pitocin) infusion (for post delivery)  125 mL/hr       Exam:  Breast Exam: negative  Abdomen: Abdomen soft, non-tender. BS normal. No masses,  No organomegaly  Fundus Non Tender: yes  Incision: dry and intact  Perineum: perineum intact  Extremity: extremities, peripheral pulses and reflexes normal, no edema, redness or tenderness in the calves or thighs      Labs:  Recent Labs     12/10/24  2056 12/12/24  0328   WBC 15.2* 10.0   RBC 4.82 3.34*   HEMOGLOBIN 13.1 9.5*   HEMATOCRIT 39.6 28.7*   MCV 82.2 85.9   MCH 27.2 28.4   MCHC 33.1 33.1   RDW 44.9 49.3   PLATELETCT 186 179   MPV 11.5 11.2        Activity:   Discharge to home  Pelvic Rest x 6 weeks  Return to hospital for signs of infection, severe onset of abdominal tenderness, difficulty breathing, new onset of blurry vision or headache, heavy vaginal bleeding, foul smelling discharge, or severe depression.      Assessment:  normal postpartum course  Discharge Assessment: No areas of skin breakdown/redness; surgical incision intact/healing     Follow up: [facility] or [facility] in 5 weeks for vaginal ; 1 week for incision check.     Desires Mirena IUD for contraception, will be placed at PP follow up     Discharge Meds:   Current Outpatient Medications   Medication Sig Dispense Refill    acetaminophen (TYLENOL) 500 MG Tab Take 2 Tablets by mouth every 6 hours for 15 days, THEN 2 Tablets every 6 hours as needed for Mild Pain for up to 15 days. 30 Tablet 0    docusate sodium 100 MG Cap Take 100 mg by mouth 2 times a day as needed for Constipation. 60 Capsule 0    enoxaparin (LOVENOX) 60 MG/0.6ML Solution Prefilled Syringe inj Inject 60 mg under the skin every day at 6 PM for 42 days. 42 Each 0    ibuprofen (MOTRIN) 800 MG Tab Take 1 Tablet by mouth every 8 hours for 15 days, THEN 1 Tablet every 8 hours as needed for Moderate Pain for up to 15 days. 30 Tablet 0    ferrous sulfate 325 (65 Fe) MG tablet Take 1 Tablet by mouth every day. 30 Tablet 11       KELLI Childers

## 2024-12-13 NOTE — ANESTHESIA TIME REPORT
Anesthesia Start and Stop Event Times       Date Time Event    12/10/2024 1640 Ready for Procedure     1641 Anesthesia Start    12/11/2024 0422 Anesthesia Stop          Responsible Staff  12/10/24 to 12/11/24      Name Role Begin End    Joseph Weir M.D. Anesth 1641 042          Overtime Reason:  no overtime (within assigned shift)    Comments:

## 2024-12-13 NOTE — PROGRESS NOTES
Pt assessed, fundus firm, lochia light. No s/s of distress. Bonding well w/ infant. Visits infant in NICU. Mother of baby @ bs, pt up to BR and voiding w/o difficulty. Denies pain at this time, yet medicated per MD orders, abd inc w/ staples, and island silver dressing, pt passing gas. ABD binder present.

## 2024-12-13 NOTE — CARE PLAN
The patient is Stable - Low risk of patient condition declining or worsening    Shift Goals  Clinical Goals: Vitals within range  Patient Goals: Bonding  Family Goals: Bonding and support    Progress made toward(s) clinical / shift goals:    Problem: Knowledge Deficit - Postpartum  Goal: Patient will verbalize and demonstrate understanding of self and infant care  Outcome: Progressing     Problem: Psychosocial - Postpartum  Goal: Patient will verbalize and demonstrate effective bonding and parenting behavior  Outcome: Progressing     Problem: Infection - Postpartum  Goal: Postpartum patient will be free of signs and symptoms of infection  Outcome: Progressing     Problem: Respiratory/Oxygenation Function Post-Surgical  Goal: Patient will achieve/maintain normal respiratory rate/effort  Outcome: Progressing     Problem: Early Mobilization - Post Surgery  Goal: Early mobilization post surgery  Outcome: Progressing       Patient is not progressing towards the following goals:

## 2024-12-13 NOTE — PROGRESS NOTES
Assumed care of patient at 1900. Received bedside report from day EMMY Charles. Patient A&Ox 4, on Room air, Reporting a pain level of 2. Call light within reach, belongings within reach, fall precautions in place, bed in lowest position. Patient does not have any other needs at this time.     POC was discussed with patient. All questions were answered. Patient verbalized understanding.

## 2024-12-19 ENCOUNTER — GYNECOLOGY VISIT (OUTPATIENT)
Dept: OBGYN | Facility: CLINIC | Age: 29
End: 2024-12-19
Payer: COMMERCIAL

## 2024-12-19 VITALS
HEART RATE: 86 BPM | DIASTOLIC BLOOD PRESSURE: 80 MMHG | WEIGHT: 236 LBS | HEIGHT: 66 IN | SYSTOLIC BLOOD PRESSURE: 115 MMHG | BODY MASS INDEX: 37.93 KG/M2

## 2024-12-19 DIAGNOSIS — Z48.89 ENCOUNTER FOR SURGICAL FOLLOW-UP CARE: ICD-10-CM

## 2024-12-19 DIAGNOSIS — Z98.891 HISTORY OF CESAREAN SECTION, LOW VERTICAL: ICD-10-CM

## 2024-12-19 PROCEDURE — 99024 POSTOP FOLLOW-UP VISIT: CPT | Performed by: STUDENT IN AN ORGANIZED HEALTH CARE EDUCATION/TRAINING PROGRAM

## 2024-12-19 ASSESSMENT — FIBROSIS 4 INDEX: FIB4 SCORE: 0.77

## 2024-12-30 NOTE — PROGRESS NOTES
CC:  Incision check No chief complaint on file.       HPI:  Bora Rodrigez 29 y.o.  who presents for incision check. He had a c/s on 24 for arrest of dilation. His recovery was uncomplicated and he was discharged on POD 2 with postpartum DVT prophylaxis for 6 weeks.        His incision was closed with metal staple and is here for removal. He has no major concerns about the incision today.      Denies lightheadedness or dizziness on ambulation. Denies Has, VC or RUQ pain.     ROS:   General: denies fever / chills  HEENT: denies sore throat:  CV: denies chest pain:  Repiratory: denies shortness of breath  GI: denies abdominal pain  : denies dysuria:     PFSH:  I personally reviewed the past medical and surgical histories.      Social History     Tobacco Use    Smoking status: Never    Smokeless tobacco: Never   Vaping Use    Vaping status: Never Used   Substance Use Topics    Alcohol use: Not Currently     Comment: averaged 1 drink/week, quit when trying to concieve    Drug use: Not Currently     Types: Oral     Comment: have occasionally used thc in the past (edibles)         Vitals:    24 1041   BP: 115/80   Pulse: 86     Gen: appears well, NAD  Respiratory: normal effort  Abdomen: Soft, non-tender. Incision is clean dry and intact. Staples in place. No erythema or open areas noted. The incision was cleaned with clorhexidine swabs and topical lidocane 5% jelly applied to incision line for comfort during staple removal. The staples were removed without difficulty. Incision is well-healed, Patient instructed to keep Isatu-pad placed in between incisional fold to facilitate keeping incision clean and dry.   Pelvic Exam: deferred                   ASSESSMENT AND PLAN:  1. History of  section, low vertical        2. Encounter for surgical follow-up care            29 y.o.  here for incision check/staple removal, 8 days postop from a primary c/s. Doing well. Incision is healed.    -discussed continued wound care with him. Recommend keeping a dry meño-pad in the incisional fold and changing it once daily to prevent moisture buildup and skin breakdown  -continue routine postop restrictions until cleared at his postpartum visit at 6-8weeks  -return precautions for infection, heavy bleeding reviewed  -all questions answered and pt agreeable to plan of care     Kassie Flores DO, FACOG  [facility]

## 2025-01-09 PROCEDURE — RXMED WILLOW AMBULATORY MEDICATION CHARGE

## 2025-01-10 ENCOUNTER — PHARMACY VISIT (OUTPATIENT)
Dept: PHARMACY | Facility: MEDICAL CENTER | Age: 30
End: 2025-01-10
Payer: COMMERCIAL

## 2025-01-19 ASSESSMENT — EDINBURGH POSTNATAL DEPRESSION SCALE (EPDS)
THINGS HAVE BEEN GETTING ON TOP OF ME: YES, SOMETIMES I HAVEN'T BEEN COPING AS WELL AS USUAL
I HAVE FELT SCARED OR PANICKY FOR NO GOOD REASON: NO, NOT MUCH
I HAVE BEEN ABLE TO LAUGH AND SEE THE FUNNY SIDE OF THINGS: AS MUCH AS I ALWAYS COULD
I HAVE BEEN SO UNHAPPY THAT I HAVE BEEN CRYING: YES, QUITE OFTEN
I HAVE BEEN ANXIOUS OR WORRIED FOR NO GOOD REASON: HARDLY EVER
I HAVE BEEN SO UNHAPPY THAT I HAVE HAD DIFFICULTY SLEEPING: NOT AT ALL
I HAVE FELT SAD OR MISERABLE: YES, QUITE OFTEN
TOTAL SCORE: 10
I HAVE LOOKED FORWARD WITH ENJOYMENT TO THINGS: AS MUCH AS I EVER DID
I HAVE BLAMED MYSELF UNNECESSARILY WHEN THINGS WENT WRONG: YES, SOME OF THE TIME
THE THOUGHT OF HARMING MYSELF HAS OCCURRED TO ME: NEVER

## 2025-01-22 ENCOUNTER — POST PARTUM (OUTPATIENT)
Dept: OBGYN | Facility: CLINIC | Age: 30
End: 2025-01-22
Payer: COMMERCIAL

## 2025-01-22 VITALS — BODY MASS INDEX: 39.03 KG/M2 | SYSTOLIC BLOOD PRESSURE: 114 MMHG | WEIGHT: 241.8 LBS | DIASTOLIC BLOOD PRESSURE: 87 MMHG

## 2025-01-22 DIAGNOSIS — Z30.430 ENCOUNTER FOR IUD INSERTION: ICD-10-CM

## 2025-01-22 LAB
POCT INT CON NEG: NEGATIVE
POCT INT CON POS: POSITIVE
POCT URINE PREGNANCY TEST: NEGATIVE

## 2025-01-22 PROCEDURE — 0503F POSTPARTUM CARE VISIT: CPT | Performed by: PHYSICIAN ASSISTANT

## 2025-01-22 PROCEDURE — 81025 URINE PREGNANCY TEST: CPT | Performed by: PHYSICIAN ASSISTANT

## 2025-01-22 PROCEDURE — 58300 INSERT INTRAUTERINE DEVICE: CPT | Performed by: PHYSICIAN ASSISTANT

## 2025-01-22 PROCEDURE — 3079F DIAST BP 80-89 MM HG: CPT | Performed by: PHYSICIAN ASSISTANT

## 2025-01-22 PROCEDURE — 3074F SYST BP LT 130 MM HG: CPT | Performed by: PHYSICIAN ASSISTANT

## 2025-01-22 RX ORDER — TESTOSTERONE ENANTHATE 200 MG/ML
50 INJECTION, SOLUTION INTRAMUSCULAR
COMMUNITY
Start: 2024-12-26

## 2025-01-22 ASSESSMENT — EDINBURGH POSTNATAL DEPRESSION SCALE (EPDS)
I HAVE BEEN SO UNHAPPY THAT I HAVE BEEN CRYING: ONLY OCCASIONALLY
I HAVE BEEN ABLE TO LAUGH AND SEE THE FUNNY SIDE OF THINGS: AS MUCH AS I ALWAYS COULD
I HAVE BEEN SO UNHAPPY THAT I HAVE HAD DIFFICULTY SLEEPING: NOT AT ALL
TOTAL SCORE: 8
I HAVE LOOKED FORWARD WITH ENJOYMENT TO THINGS: AS MUCH AS I EVER DID
I HAVE BEEN ANXIOUS OR WORRIED FOR NO GOOD REASON: HARDLY EVER
I HAVE FELT SCARED OR PANICKY FOR NO GOOD REASON: NO, NOT MUCH
THINGS HAVE BEEN GETTING ON TOP OF ME: YES, SOMETIMES I HAVEN'T BEEN COPING AS WELL AS USUAL
THE THOUGHT OF HARMING MYSELF HAS OCCURRED TO ME: NEVER
I HAVE BLAMED MYSELF UNNECESSARILY WHEN THINGS WENT WRONG: YES, SOME OF THE TIME
I HAVE FELT SAD OR MISERABLE: NOT VERY OFTEN

## 2025-01-22 ASSESSMENT — FIBROSIS 4 INDEX: FIB4 SCORE: 0.77

## 2025-01-22 NOTE — PROGRESS NOTES
POST PARTUM VISIT NOTE    SUBJECTIVE:  Bora Rodrigez is a 29 y.o.  who presents for postpartum exam. Pt is transgender male, has already started back on Testosterone.   I have reviewed the prenatal and intrapartum course. Pregnancy complicated by Rh- status, large for gestational age fetus, preeclampsia with severe features.     Patient is doing well. Infant is gaining weight on their growth curve and seeing pediatrician as directed.     Date of delivery :  24  Type of delivery: Primary  section for arrest of dilation      Last pap date: 2024  NILM    Delivery laceration(s): n/a  Feeding: bottle feeding q4-6h   Postpartum course: uncomplicated, no urinary leakage  Contraceptive plans: Mirena     ROS:  She feels well healed.   Laceration and/or Incision is well healed.   Bleeding lasted about  6 weeks.  She is getting along well with her partner.   She reports her mood is good. Denies postpartum blues.   She has not had sex.   Denies urinary or stool incontinence.    I have reviewed the patient's PMH for contraceptive risk factors, and she has no contraindications to contraception as planned.     OBJECTIVE:    /87   Wt 241 lb 12.8 oz   LMP 2024   BMI 39.03 kg/m²     Appears well, vital signs normal.  Breast Exam: exam deferred.  Abdomen: soft, nontender  Pelvic Exam:  Perineum: perineum intact and well healed  Vulva: No external lesions, normal hair distribution, no adenopathy  Urethra: appears normal with no lesions    EPDS score:  8    ASSESSMENT:  normal postpartum course    PLAN:  RTO for annual exam due in 4 months  Contraception planned:  Mirena (see procedure note)  Discussed appropriate pregnancy interval of 12-18m.   Pt cleared for activity and off pelvic rest.       Dulce Maria Adler P.A.-C.

## 2025-01-22 NOTE — PROGRESS NOTES
Pt here to today for postpartum exam  Breast or Bottle: bottle formula   Delivery type: C section   Desired BCM: Mirena IUD interested.   LMP: not yet   Last PAP: 05/09/2024 Wnl   Phone and Pharmacy Verified   Pt states he has no pain and bleeding was minimal.

## 2025-01-22 NOTE — PROCEDURES
IUD INSERTION PROCEDURE NOTE    Bora Rodrigez  is here for IUD insertion.     Today the patient is counseled on procedure of IUD insertion. I discussed with the patient the risks of IUD including infection, risk of IUD expulsion, the risk of uterine perforation (1-1000, slightly higher while Bfing), risk of IUD migration or lost strings.  If the IUD does migrate the patient may require a separate procedure such as hysteroscopy or laparoscopy to remove or retrieve the migrated IUD. I also discussed the 0.1% risk of pregnancy with IUD use which coincides with an increased risk of ectopic pregnancy with IUD use.  We reviewed leaving the strings long enough to prevent disappearance however this may initially result in the possibility that partner can feel the IUD during intercourse; this typically resolves as the strings soften and tuck behind cervix. I also discussed the side effects of progestin-containing IUDs including decreased, irregular or absent menses and/or spotting.  All questions answered.  Informed consent is signed.  UPT negative.     /87   Wt 241 lb 12.8 oz   LMP 2024   BMI 39.03 kg/m²     Procedure  The pelvis was examined and the uterus is anteverted.  The speculum was placed.  Beta-dine was applied to the cervix.  Allis clamp was used used to grasp the cervix and provide counter traction.  The uterus was sounded with the IUD device to 10cm. The device was withdrawn 1cm and the IUD was then deployed and gently advanced to the fundus without complications then the device removed.  Type of IUD placed: Mirena       Aftercare discussed. She is to return for fever, worsening pelvic pain, abdominal pain, syncope, unusually heavy vaginal bleeding, suspected expulsion, foul smelling vaginal discharge, or pregnancy-like symptoms.     If the patient is comfortable she may also perform a digital self examination to check for the strings.  Return to the office in 4-6 weeks for  string check or any concerns with IUD symptoms/placement/possible expulsion.      Dulce Maria Adler P.A.-C.

## 2025-02-19 ENCOUNTER — GYNECOLOGY VISIT (OUTPATIENT)
Dept: OBGYN | Facility: CLINIC | Age: 30
End: 2025-02-19
Payer: COMMERCIAL

## 2025-02-19 VITALS — WEIGHT: 248 LBS | SYSTOLIC BLOOD PRESSURE: 119 MMHG | BODY MASS INDEX: 40.03 KG/M2 | DIASTOLIC BLOOD PRESSURE: 80 MMHG

## 2025-02-19 DIAGNOSIS — Z30.431 IUD CHECK UP: ICD-10-CM

## 2025-02-19 PROBLEM — Z34.80 PRENATAL CARE, SUBSEQUENT PREGNANCY: Status: RESOLVED | Noted: 2024-05-09 | Resolved: 2025-02-19

## 2025-02-19 PROBLEM — Z34.90 ENCOUNTER FOR INDUCTION OF LABOR: Status: RESOLVED | Noted: 2024-12-09 | Resolved: 2025-02-19

## 2025-02-19 PROBLEM — O99.210 OBESITY IN PREGNANCY: Status: RESOLVED | Noted: 2024-11-26 | Resolved: 2025-02-19

## 2025-02-19 PROBLEM — O36.63X0 EXCESSIVE FETAL GROWTH AFFECTING MANAGEMENT OF MOTHER IN THIRD TRIMESTER, ANTEPARTUM: Status: RESOLVED | Noted: 2024-11-26 | Resolved: 2025-02-19

## 2025-02-19 ASSESSMENT — FIBROSIS 4 INDEX: FIB4 SCORE: 0.77

## 2025-02-19 NOTE — PROGRESS NOTES
GYN PROBLEM VISIT    CC:  IUD string check      HPI: Patient is a 29 y.o.  No LMP recorded.  in for IUD string check.   Mirena IUD was placed 2025.  Bleeding: light spotting that is improving   Pain: mild intermittent cramping      ROS:   General: denies fever / chills  HEENT: denies sore throat:  CV: denies chest pain:  Repiratory: denies shortness of breath  GI: denies abdominal pain  : denies dysuria:    PFSH:  I personally reviewed the past medical and surgical histories.     Social History     Tobacco Use    Smoking status: Never    Smokeless tobacco: Never   Vaping Use    Vaping status: Never Used   Substance Use Topics    Alcohol use: Not Currently     Comment: averaged 1 drink/week, quit when trying to concieve    Drug use: Not Currently     Types: Oral     Comment: have occasionally used thc in the past (edibles)        Social History     Substance and Sexual Activity   Sexual Activity Yes    Partners: Male    Comment: pt is transgender male, sexually active with transgender female        ALLERGIES / REACTIONS:  Allergies   Allergen Reactions    Cottonseed Oil Hives                           PHYSICAL EXAMINATION:  Vital Signs:   /80 (BP Location: Right arm, Patient Position: Sitting, BP Cuff Size: Adult)   Wt 248 lb   BMI 40.03 kg/m²     Gen: appears well, NAD  Respiratory: normal effort  Abdomen: Soft, non-tender.  Pelvic Exam:    Vulva: normal.    Urethra: normal.   Vagina: normal, no lesions.    Cervix: Strings visualized and in place 2-3 cm long. Otherwise cervix is pink and moist with no erythema.   Perineum: normal.      ASSESSMENT AND PLAN:  29 y.o.      IUD String Check      - IUD strings visualized. Reviewed bleeding expectations with IUD especially possibility for abnormal bleeding first few months after insertion. Can do self- string checks about monthly. No follow up needed unless pt experiences pelvic pain or bothersome bleeding. Otherwise, return to annual check  ups.       Follow up in 1 year    Dulce Maria Adler P.A.-C.  [facility]

## 2025-06-27 ENCOUNTER — HOSPITAL ENCOUNTER (OUTPATIENT)
Dept: LAB | Facility: MEDICAL CENTER | Age: 30
End: 2025-06-27
Attending: STUDENT IN AN ORGANIZED HEALTH CARE EDUCATION/TRAINING PROGRAM
Payer: COMMERCIAL

## 2025-06-27 LAB
BASOPHILS # BLD AUTO: 0.8 % (ref 0–1.8)
BASOPHILS # BLD: 0.04 K/UL (ref 0–0.12)
EOSINOPHIL # BLD AUTO: 0.08 K/UL (ref 0–0.51)
EOSINOPHIL NFR BLD: 1.6 % (ref 0–6.9)
ERYTHROCYTE [DISTWIDTH] IN BLOOD BY AUTOMATED COUNT: 41 FL (ref 35.9–50)
HCT VFR BLD AUTO: 44.1 % (ref 37–47)
HGB BLD-MCNC: 14.3 G/DL (ref 12–16)
IMM GRANULOCYTES # BLD AUTO: 0.01 K/UL (ref 0–0.11)
IMM GRANULOCYTES NFR BLD AUTO: 0.2 % (ref 0–0.9)
LYMPHOCYTES # BLD AUTO: 1.92 K/UL (ref 1–4.8)
LYMPHOCYTES NFR BLD: 39.1 % (ref 22–41)
MCH RBC QN AUTO: 24.8 PG (ref 27–33)
MCHC RBC AUTO-ENTMCNC: 32.4 G/DL (ref 32.2–35.5)
MCV RBC AUTO: 76.6 FL (ref 81.4–97.8)
MONOCYTES # BLD AUTO: 0.42 K/UL (ref 0–0.85)
MONOCYTES NFR BLD AUTO: 8.6 % (ref 0–13.4)
NEUTROPHILS # BLD AUTO: 2.44 K/UL (ref 1.82–7.42)
NEUTROPHILS NFR BLD: 49.7 % (ref 44–72)
NRBC # BLD AUTO: 0 K/UL
NRBC BLD-RTO: 0 /100 WBC (ref 0–0.2)
PLATELET # BLD AUTO: 210 K/UL (ref 164–446)
PMV BLD AUTO: 11 FL (ref 9–12.9)
RBC # BLD AUTO: 5.76 M/UL (ref 4.2–5.4)
WBC # BLD AUTO: 4.9 K/UL (ref 4.8–10.8)

## 2025-06-27 PROCEDURE — 36415 COLL VENOUS BLD VENIPUNCTURE: CPT

## 2025-06-27 PROCEDURE — 85025 COMPLETE CBC W/AUTO DIFF WBC: CPT

## (undated) DEVICE — SUTURE 4-0 MONOCRYL PLUS PS-2 - 27 INCH (36/BX)

## (undated) DEVICE — TUBE CONNECTING SUCTION - CLEAR PLASTIC STERILE 72 IN (50EA/CA)

## (undated) DEVICE — SET EXTENSION WITH 2 PORTS (48EA/CA) ***PART #2C8610 IS A SUBSTITUTE*****

## (undated) DEVICE — TOWEL STOP TIMEOUT SAFETY FLAG (40EA/CA)

## (undated) DEVICE — Device

## (undated) DEVICE — KIT  I.V. START (100EA/CA)

## (undated) DEVICE — NEEDLE INSFL 120MM 14GA VRRS - (20/BX)

## (undated) DEVICE — BAG RETRIEVAL 10ML (10EA/BX)

## (undated) DEVICE — PORT ACCESS MINISINGLE SITE GELPOINT (1/EA)

## (undated) DEVICE — CANISTER SUCTION 3000ML MECHANICAL FILTER AUTO SHUTOFF MEDI-VAC NONSTERILE LF DISP (40EA/CA)

## (undated) DEVICE — PENCIL ELECTSURG 10FT HLSTR - WITH BLADE (50EA/CA)

## (undated) DEVICE — DRAPESURG STERI-DRAPE LONG - (10/BX 4BX/CA)

## (undated) DEVICE — SUTURE 0 VICRYL PLUS CT 36 (36PK/BX)"

## (undated) DEVICE — SODIUM CHL IRRIGATION 0.9% 1000ML (12EA/CA)

## (undated) DEVICE — STAPLER SKIN DISP - (6/BX 10BX/CA) VISISTAT

## (undated) DEVICE — WATER IRRIGATION STERILE 1000ML (12EA/CA)

## (undated) DEVICE — GLOVE BIOGEL SZ 8 SURGICAL PF LTX - (50PR/BX 4BX/CA)

## (undated) DEVICE — SUTURE 1 CHROMIC CTX ETHICON - (36PK/BX)

## (undated) DEVICE — DRESSING INTERCEED ABSORBABLE ADHESION BARRIER TC7 (10EA/CA)

## (undated) DEVICE — ADHESIVE MASTISOL - (48/BX)

## (undated) DEVICE — SUTURE 3-0 VICRYL PLUS CT-1 - 36 INCH (36/BX)

## (undated) DEVICE — ELECTRODE DUAL RETURN W/ CORD - (50/PK)

## (undated) DEVICE — LACTATED RINGERS INJ 1000 ML - (14EA/CA 60CA/PF)

## (undated) DEVICE — SLEEVE VASO CALF MED - (10PR/CA)

## (undated) DEVICE — CATHETER IV NON-SAFETY 18 GA X 1 1/4 (50/BX 4BX/CA)

## (undated) DEVICE — GLOVE BIOGEL PI INDICATOR SZ 6.0 SURGICAL PF LF -(200PR/CA)

## (undated) DEVICE — UTERINE MANIPULATOR 4.5MM ZSI - 1151 12/BX

## (undated) DEVICE — CHLORAPREP 26 ML APPLICATOR - ORANGE TINT(25/CA)

## (undated) DEVICE — PACK ROOM TURNOVER L&D (12/CA)

## (undated) DEVICE — SUTURE 0 VICRYL PLUS UR-6 - 27 INCH (36/BX)

## (undated) DEVICE — TRAY SPINAL ANESTHESIA NON-SAFETY (10/CA)

## (undated) DEVICE — SUTURE 0 VICRYL PLUS CT-1 - 36 INCH (36/BX)

## (undated) DEVICE — CANNULA O2 COMFORT SOFT EAR ADULT 7 FT TUBING (50/CA)

## (undated) DEVICE — HEMOSTAT ABSORBABLE POWDER SURGICEL 3G (5EA/BX)

## (undated) DEVICE — TROCAR 5X100 SEPARTATOR ADV - FIXATION (6/BX)

## (undated) DEVICE — SET TUBING PNEUMOCLEAR HIGH FLOW SMOKE EVACUATION (10EA/BX)

## (undated) DEVICE — PAD SANITARY 11IN MAXI IND WRAPPED  (12EA/PK 24PK/CA)

## (undated) DEVICE — CANISTER SUCTION RIGID RED 1500CC (40EA/CA)

## (undated) DEVICE — GOWN WARMING STANDARD FLEX - (30/CA)

## (undated) DEVICE — BAG RETRIEVAL 12/15 MM INZII (5EA/CA) THIS WILL REPLACE ITEM 75018

## (undated) DEVICE — SET LEADWIRE 5 LEAD BEDSIDE DISPOSABLE ECG (1SET OF 5/EA)

## (undated) DEVICE — SYSTEM CLEARIFY VISUALIZATION (10EA/PK)

## (undated) DEVICE — TUBE E-T HI-LO CUFF 7.0MM (10EA/PK)

## (undated) DEVICE — SENSOR OXIMETER ADULT SPO2 RD SET (20EA/BX)

## (undated) DEVICE — HEAD HOLDER JUNIOR/ADULT

## (undated) DEVICE — DRESSING POST OP BORDER 4 X 10 (5EA/BX)

## (undated) DEVICE — CANISTER SUCTION 3000ML MECHANICAL FILTER AUTO SHUTOFF MEDI-VAC NONSTERILE LF DISP  (40EA/CA)

## (undated) DEVICE — TUBING CLEARLINK DUO-VENT - C-FLO (48EA/CA)

## (undated) DEVICE — MASK OXYGEN VNYL ADLT MED CONC WITH 7 FOOT TUBING  - (50EA/CA)

## (undated) DEVICE — BLANKET UNDERBODY FULL ACCES - (5/CA)

## (undated) DEVICE — GLOVE BIOGEL PI INDICATOR SZ 6.5 SURGICAL PF LF - (50/BX 4BX/CA)

## (undated) DEVICE — SUTURE 3-0 VICRYL PLUS SH - 27 INCH (36/BX)

## (undated) DEVICE — GELPORT 120MM - (1/EA)

## (undated) DEVICE — PACK C-SECTION (2EA/CA)

## (undated) DEVICE — SUCTION INSTRUMENT YANKAUER BULBOUS TIP W/O VENT (50EA/CA)

## (undated) DEVICE — TAPE CLOTH MEDIPORE 6 INCH - (12RL/CA)

## (undated) DEVICE — SUTURE 2-0 CHROMIC GUT CT-1 27 (36PK/BX)"